# Patient Record
Sex: FEMALE | Race: WHITE | NOT HISPANIC OR LATINO | Employment: OTHER | URBAN - METROPOLITAN AREA
[De-identification: names, ages, dates, MRNs, and addresses within clinical notes are randomized per-mention and may not be internally consistent; named-entity substitution may affect disease eponyms.]

---

## 2017-01-10 RX ORDER — SPIRONOLACTONE 100 MG/1
100 TABLET, FILM COATED ORAL DAILY
Qty: 90 TABLET | Refills: 3 | Status: SHIPPED | OUTPATIENT
Start: 2017-01-10 | End: 2017-01-10 | Stop reason: SDUPTHER

## 2017-01-10 RX ORDER — SPIRONOLACTONE 100 MG/1
100 TABLET, FILM COATED ORAL DAILY
Qty: 90 TABLET | Refills: 3 | Status: SHIPPED | OUTPATIENT
Start: 2017-01-10 | End: 2017-01-11 | Stop reason: SDUPTHER

## 2017-01-10 NOTE — TELEPHONE ENCOUNTER
i refilled rx spironolactone but it was sent without a pharmacy. Please find out from her which pharmacy for me to resend  to

## 2017-01-12 RX ORDER — SPIRONOLACTONE 100 MG/1
100 TABLET, FILM COATED ORAL DAILY
Qty: 90 TABLET | Refills: 3 | Status: SHIPPED | OUTPATIENT
Start: 2017-01-12 | End: 2017-06-29 | Stop reason: SDUPTHER

## 2017-01-12 RX ORDER — BUPROPION HYDROCHLORIDE 150 MG/1
150 TABLET, EXTENDED RELEASE ORAL 2 TIMES DAILY
Qty: 180 TABLET | Refills: 3 | Status: SHIPPED | OUTPATIENT
Start: 2017-01-12 | End: 2017-01-30

## 2017-01-23 DIAGNOSIS — N94.3 PMS (PREMENSTRUAL SYNDROME): ICD-10-CM

## 2017-01-23 RX ORDER — BUPROPION HYDROCHLORIDE 150 MG/1
150 TABLET ORAL 2 TIMES DAILY
Qty: 180 TABLET | Refills: 3 | Status: SHIPPED | OUTPATIENT
Start: 2017-01-23 | End: 2017-01-31 | Stop reason: SDUPTHER

## 2017-01-23 RX ORDER — BUPROPION HYDROCHLORIDE 150 MG/1
150 TABLET, EXTENDED RELEASE ORAL 2 TIMES DAILY
Qty: 180 TABLET | Refills: 3 | Status: CANCELLED | OUTPATIENT
Start: 2017-01-23

## 2017-01-30 ENCOUNTER — PATIENT MESSAGE (OUTPATIENT)
Dept: INTERNAL MEDICINE | Facility: CLINIC | Age: 52
End: 2017-01-30

## 2017-01-30 ENCOUNTER — TELEPHONE (OUTPATIENT)
Dept: INTERNAL MEDICINE | Facility: CLINIC | Age: 52
End: 2017-01-30

## 2017-01-30 DIAGNOSIS — N94.3 PMS (PREMENSTRUAL SYNDROME): ICD-10-CM

## 2017-01-30 NOTE — TELEPHONE ENCOUNTER
----- Message from Luisa Cabrera sent at 1/30/2017 10:06 AM CST -----  Contact: [facility] [phone] Ref # [id number]  [facility] requests nurse  To call and clarify pt's prescription for Wellbutrin. [phone]    ..    [facility] - [address]  [address]  [place] [address]  Phone: [phone] Fax: [phone]

## 2017-01-31 RX ORDER — BUPROPION HYDROCHLORIDE 150 MG/1
150 TABLET ORAL DAILY
Qty: 180 TABLET | Refills: 3 | Status: SHIPPED | OUTPATIENT
Start: 2017-01-31 | End: 2017-06-29 | Stop reason: SDUPTHER

## 2017-06-19 ENCOUNTER — NURSE TRIAGE (OUTPATIENT)
Dept: ADMINISTRATIVE | Facility: CLINIC | Age: 52
End: 2017-06-19

## 2017-06-19 NOTE — TELEPHONE ENCOUNTER
Reason for Disposition   SEVERE vomiting (e.g., 6 or more times/day)    Protocols used: ST VOMITING-A-OH

## 2017-06-26 ENCOUNTER — LAB VISIT (OUTPATIENT)
Dept: LAB | Facility: HOSPITAL | Age: 52
End: 2017-06-26
Attending: FAMILY MEDICINE
Payer: COMMERCIAL

## 2017-06-26 DIAGNOSIS — Z00.00 ROUTINE HEALTH MAINTENANCE: ICD-10-CM

## 2017-06-26 LAB
ANION GAP SERPL CALC-SCNC: 9 MMOL/L
BUN SERPL-MCNC: 8 MG/DL
CALCIUM SERPL-MCNC: 9.9 MG/DL
CHLORIDE SERPL-SCNC: 107 MMOL/L
CHOLEST/HDLC SERPL: 7.2 {RATIO}
CO2 SERPL-SCNC: 25 MMOL/L
CREAT SERPL-MCNC: 0.8 MG/DL
EST. GFR  (AFRICAN AMERICAN): >60 ML/MIN/1.73 M^2
EST. GFR  (NON AFRICAN AMERICAN): >60 ML/MIN/1.73 M^2
GLUCOSE SERPL-MCNC: 94 MG/DL
HDL/CHOLESTEROL RATIO: 14 %
HDLC SERPL-MCNC: 215 MG/DL
HDLC SERPL-MCNC: 30 MG/DL
LDLC SERPL CALC-MCNC: 160.8 MG/DL
NONHDLC SERPL-MCNC: 185 MG/DL
POTASSIUM SERPL-SCNC: 4.6 MMOL/L
SODIUM SERPL-SCNC: 141 MMOL/L
TRIGL SERPL-MCNC: 121 MG/DL
TSH SERPL DL<=0.005 MIU/L-ACNC: 0.49 UIU/ML

## 2017-06-26 PROCEDURE — 84443 ASSAY THYROID STIM HORMONE: CPT

## 2017-06-26 PROCEDURE — 36415 COLL VENOUS BLD VENIPUNCTURE: CPT | Mod: PO

## 2017-06-26 PROCEDURE — 80061 LIPID PANEL: CPT

## 2017-06-26 PROCEDURE — 80048 BASIC METABOLIC PNL TOTAL CA: CPT

## 2017-06-29 ENCOUNTER — OFFICE VISIT (OUTPATIENT)
Dept: INTERNAL MEDICINE | Facility: CLINIC | Age: 52
End: 2017-06-29
Payer: COMMERCIAL

## 2017-06-29 VITALS
DIASTOLIC BLOOD PRESSURE: 78 MMHG | HEART RATE: 82 BPM | WEIGHT: 178.38 LBS | BODY MASS INDEX: 26.42 KG/M2 | OXYGEN SATURATION: 96 % | HEIGHT: 69 IN | TEMPERATURE: 98 F | SYSTOLIC BLOOD PRESSURE: 108 MMHG

## 2017-06-29 DIAGNOSIS — Z00.00 ANNUAL PHYSICAL EXAM: Primary | ICD-10-CM

## 2017-06-29 DIAGNOSIS — E03.9 HYPOTHYROIDISM, UNSPECIFIED TYPE: ICD-10-CM

## 2017-06-29 DIAGNOSIS — K58.9 IRRITABLE BOWEL SYNDROME WITHOUT DIARRHEA: ICD-10-CM

## 2017-06-29 DIAGNOSIS — G47.63 SLEEP-RELATED BRUXISM: ICD-10-CM

## 2017-06-29 DIAGNOSIS — F32.A DEPRESSION, UNSPECIFIED DEPRESSION TYPE: ICD-10-CM

## 2017-06-29 DIAGNOSIS — E78.5 HYPERLIPIDEMIA, UNSPECIFIED HYPERLIPIDEMIA TYPE: ICD-10-CM

## 2017-06-29 DIAGNOSIS — K21.9 GASTROESOPHAGEAL REFLUX DISEASE WITHOUT ESOPHAGITIS: ICD-10-CM

## 2017-06-29 PROCEDURE — 99396 PREV VISIT EST AGE 40-64: CPT | Mod: S$GLB,,, | Performed by: NURSE PRACTITIONER

## 2017-06-29 PROCEDURE — 99999 PR PBB SHADOW E&M-EST. PATIENT-LVL IV: CPT | Mod: PBBFAC,,, | Performed by: NURSE PRACTITIONER

## 2017-06-29 RX ORDER — HYOSCYAMINE SULFATE 0.38 MG/1
0.38 TABLET, EXTENDED RELEASE ORAL EVERY 12 HOURS PRN
Qty: 90 TABLET | Refills: 3 | Status: SHIPPED | OUTPATIENT
Start: 2017-06-29 | End: 2017-12-15 | Stop reason: SDUPTHER

## 2017-06-29 RX ORDER — SPIRONOLACTONE 100 MG/1
100 TABLET, FILM COATED ORAL DAILY
Qty: 90 TABLET | Refills: 3 | Status: SHIPPED | OUTPATIENT
Start: 2017-06-29 | End: 2017-12-15 | Stop reason: SDUPTHER

## 2017-06-29 RX ORDER — BUPROPION HYDROCHLORIDE 150 MG/1
150 TABLET ORAL DAILY
Qty: 180 TABLET | Refills: 3 | Status: SHIPPED | OUTPATIENT
Start: 2017-06-29 | End: 2017-12-15 | Stop reason: SDUPTHER

## 2017-06-29 NOTE — PROGRESS NOTES
Subjective:       Patient ID: Vera Greer is a 52 y.o. female.    Chief Complaint: No chief complaint on file.    Pt here for annual wellness exam    Pt doing well        Past Medical History:  Anxiety - controlled with wellbutrin -   Depression - well controlled on wellbutrin   GERD (gastroesophageal reflux disease)- well controlled with Prilosec once weekly and diet  Hypothyroid-- TSH- not currently taking thyroid medication. No   Irritable bowel syndrome--- levbid- controlled- drinks lots of fluids -BM daily. No blood or mucus in stool  Sleep-related bruxism no need for mouth piece-- sees --every 6 months dentist    Eye exam- been a few years since last exam. Wears Reading glasses    Colonoscopy-done last year- normal  She mentions that she does not exercise regularly  Watches diet- vege/fruit;   Up to date with mammogram/pap done this Feb- sees Dr. Moses Caceres at [facility] ;Dr. Aiden Crystal- [place]- [facility] - started hormone replcement therapy a week ago. No acute problems. No hot flashes - good energy       Review of Systems   Constitutional: Negative for chills, fatigue and fever.   HENT: Negative for congestion, ear pain, postnasal drip, rhinorrhea, sinus pressure, sneezing and sore throat.    Eyes: Negative for photophobia, pain and visual disturbance.   Respiratory: Negative for cough, chest tightness and shortness of breath.    Cardiovascular: Negative for chest pain, palpitations and leg swelling.   Gastrointestinal: Negative for abdominal pain, constipation, diarrhea, nausea and vomiting.   Genitourinary: Negative for dysuria and frequency.   Musculoskeletal: Negative for arthralgias.   Neurological: Negative for dizziness, light-headedness and headaches.   Psychiatric/Behavioral: Negative for sleep disturbance.       Objective:      Physical Exam   Constitutional: She is oriented to person, place, and time. She appears well-developed and well-nourished.   HENT:   Head: Normocephalic  and atraumatic.   Right Ear: Tympanic membrane normal.   Left Ear: Tympanic membrane normal.   Eyes: Conjunctivae and EOM are normal. Pupils are equal, round, and reactive to light.   Neck: Normal range of motion. Neck supple.   Cardiovascular: Normal rate, regular rhythm, normal heart sounds and intact distal pulses.    Pulmonary/Chest: Effort normal and breath sounds normal.   Abdominal: Soft. Bowel sounds are normal.   Musculoskeletal: Normal range of motion.   Neurological: She is alert and oriented to person, place, and time.   Skin: Skin is warm and dry.   Psychiatric: She has a normal mood and affect.       Assessment:       1. Annual physical exam    2. Hypothyroidism, unspecified type    3. Irritable bowel syndrome without diarrhea    4. Sleep-related bruxism    5. Gastroesophageal reflux disease without esophagitis    6. Depression, unspecified depression type    7. Hyperlipidemia, unspecified hyperlipidemia type        Plan:   Annual physical exam    Hypothyroidism, unspecified type    Irritable bowel syndrome without diarrhea  -     hyoscyamine (LEVBID) 0.375 mg Tb12; Take 1 tablet (0.375 mg total) by mouth every 12 (twelve) hours as needed.  Dispense: 90 tablet; Refill: 3    Sleep-related bruxism    Gastroesophageal reflux disease without esophagitis    Depression, unspecified depression type  -     buPROPion (WELLBUTRIN XL) 150 MG TB24 tablet; Take 1 tablet (150 mg total) by mouth once daily.  Dispense: 180 tablet; Refill: 3    Hyperlipidemia, unspecified hyperlipidemia type    Other orders  -     spironolactone (ALDACTONE) 100 MG tablet; Take 1 tablet (100 mg total) by mouth once daily.  Dispense: 90 tablet; Refill: 3    med refills as above  Pt doing well  Watch TSH level  Follow up in 1 year or sooner as needed

## 2017-10-10 ENCOUNTER — PATIENT MESSAGE (OUTPATIENT)
Dept: INTERNAL MEDICINE | Facility: CLINIC | Age: 52
End: 2017-10-10

## 2017-11-08 ENCOUNTER — OFFICE VISIT (OUTPATIENT)
Dept: DERMATOLOGY | Facility: CLINIC | Age: 52
End: 2017-11-08
Payer: COMMERCIAL

## 2017-11-08 DIAGNOSIS — L29.9 SCALP PRURITUS: ICD-10-CM

## 2017-11-08 DIAGNOSIS — L82.1 SEBORRHEIC KERATOSIS: ICD-10-CM

## 2017-11-08 DIAGNOSIS — K13.0 PERLECHE: Primary | ICD-10-CM

## 2017-11-08 DIAGNOSIS — D22.9 MULTIPLE NEVI: ICD-10-CM

## 2017-11-08 PROCEDURE — 99203 OFFICE O/P NEW LOW 30 MIN: CPT | Mod: S$GLB,,, | Performed by: DERMATOLOGY

## 2017-11-08 PROCEDURE — 99999 PR PBB SHADOW E&M-EST. PATIENT-LVL II: CPT | Mod: PBBFAC,,, | Performed by: DERMATOLOGY

## 2017-11-08 RX ORDER — KETOCONAZOLE 20 MG/G
CREAM TOPICAL
Qty: 60 G | Refills: 1 | Status: SHIPPED | OUTPATIENT
Start: 2017-11-08 | End: 2018-06-18

## 2017-11-08 NOTE — PROGRESS NOTES
Subjective:       Patient ID:  Vera Greer is a 52 y.o. female who presents for   Chief Complaint   Patient presents with    Skin Check     TBSE     Pt was previously seen by Dr. Montero, here today to establish care    History of Present Illness: The patient presents with chief complaint of scalp pruritus. Onset after use of hair and nail vitamins.   Location: scalp  Duration: several months  Signs/Symptoms: pruritus    Prior treatments: none          Review of Systems   Constitutional: Negative for fever and chills.   Gastrointestinal: Negative for nausea and vomiting.   Skin: Negative for daily sunscreen use, activity-related sunscreen use and recent sunburn.   Hematologic/Lymphatic: Does not bruise/bleed easily.        Objective:    Physical Exam   Constitutional: She appears well-developed and well-nourished. No distress.   Neurological: She is alert and oriented to person, place, and time. She is not disoriented.   Psychiatric: She has a normal mood and affect.   Skin:   Areas Examined (abnormalities noted in diagram):   Scalp / Hair Palpated and Inspected  Head / Face Inspection Performed  Neck Inspection Performed  Chest / Axilla Inspection Performed  Abdomen Inspection Performed  Genitals / Buttocks / Groin Inspection Performed  Back Inspection Performed  RUE Inspected  LUE Inspection Performed  RLE Inspected  LLE Inspection Performed  Nails and Digits Inspection Performed                   Diagram Legend     Erythematous scaling macule/papule c/w actinic keratosis       Vascular papule c/w angioma      Pigmented verrucoid papule/plaque c/w seborrheic keratosis      Yellow umbilicated papule c/w sebaceous hyperplasia      Irregularly shaped tan macule c/w lentigo     1-2 mm smooth white papules consistent with Milia      Movable subcutaneous cyst with punctum c/w epidermal inclusion cyst      Subcutaneous movable cyst c/w pilar cyst      Firm pink to brown papule c/w dermatofibroma      Pedunculated  fleshy papule(s) c/w skin tag(s)      Evenly pigmented macule c/w junctional nevus     Mildly variegated pigmented, slightly irregular-bordered macule c/w mildly atypical nevus      Flesh colored to evenly pigmented papule c/w intradermal nevus       Pink pearly papule/plaque c/w basal cell carcinoma      Erythematous hyperkeratotic cursted plaque c/w SCC      Surgical scar with no sign of skin cancer recurrence      Open and closed comedones      Inflammatory papules and pustules      Verrucoid papule consistent consistent with wart     Erythematous eczematous patches and plaques     Dystrophic onycholytic nail with subungual debris c/w onychomycosis     Umbilicated papule    Erythematous-base heme-crusted tan verrucoid plaque consistent with inflamed seborrheic keratosis     Erythematous Silvery Scaling Plaque c/w Psoriasis     See annotation      Assessment / Plan:        Perleche  -     ketoconazole (NIZORAL) 2 % cream; AAA bid prn rash near lips  Dispense: 60 g; Refill: 1  -     Recommend blotting area with vinegar 2-3 times/day    Multiple nevi  Reassurance given.  Discussed ABCDEF of melanoma and changes for patient to look for.  AAD Handout given. Discussed importance of daily use of sunscreen.      Seborrheic keratosis  Reassurance given. Discussed diagnosis and that lesions are benign.  AAD handout given.     Scalp pruritus  Pt defers treatment, states pruritus is secondary to hair/nail supplement.           Return in about 1 year (around 11/8/2018).     Addendum:  Records received from Dr. Montero.  Pt with hx of several benign lesions including nevi and SK.  Pt also s/p use of tretinoin 0.05% cream and retin-A 0.1% micro in past.

## 2017-11-08 NOTE — PATIENT INSTRUCTIONS

## 2017-12-15 DIAGNOSIS — K58.9 IRRITABLE BOWEL SYNDROME WITHOUT DIARRHEA: ICD-10-CM

## 2017-12-15 DIAGNOSIS — F32.A DEPRESSION, UNSPECIFIED DEPRESSION TYPE: ICD-10-CM

## 2017-12-15 RX ORDER — HYOSCYAMINE SULFATE 0.38 MG/1
0.38 TABLET, EXTENDED RELEASE ORAL EVERY 12 HOURS PRN
Qty: 90 TABLET | Refills: 3 | Status: SHIPPED | OUTPATIENT
Start: 2017-12-15 | End: 2018-06-18 | Stop reason: SDUPTHER

## 2017-12-15 RX ORDER — BUPROPION HYDROCHLORIDE 150 MG/1
150 TABLET ORAL DAILY
Qty: 180 TABLET | Refills: 3 | Status: SHIPPED | OUTPATIENT
Start: 2017-12-15 | End: 2018-06-18 | Stop reason: SDUPTHER

## 2017-12-15 RX ORDER — SPIRONOLACTONE 100 MG/1
100 TABLET, FILM COATED ORAL DAILY
Qty: 90 TABLET | Refills: 3 | Status: SHIPPED | OUTPATIENT
Start: 2017-12-15 | End: 2018-06-18 | Stop reason: SDUPTHER

## 2017-12-15 NOTE — TELEPHONE ENCOUNTER
----- Message from Juan David Rodríguez sent at 12/15/2017  2:04 PM CST -----  Contact: pt   States she's calling rg wanting to discuss getting her refill and was last in this past June and was told she needs to be seen and can be reached at [phone]//[email or web address]

## 2017-12-15 NOTE — TELEPHONE ENCOUNTER
----- Message from Juan David Rodríguez sent at 12/15/2017  2:04 PM CST -----  Contact: pt   States she's calling rg wanting to discuss getting her refill and was last in this past June and was told she needs to be seen and can be reached at [phone]//[email or web address]

## 2017-12-16 NOTE — TELEPHONE ENCOUNTER
Please let her know I made an error . She saw Ashleigh June 2017 ( me June 2016) so no need to come in until June 2018 and I will refill meds

## 2017-12-18 ENCOUNTER — TELEPHONE (OUTPATIENT)
Dept: PODIATRY | Facility: CLINIC | Age: 52
End: 2017-12-18

## 2017-12-18 DIAGNOSIS — M79.671 RIGHT FOOT PAIN: Primary | ICD-10-CM

## 2017-12-18 NOTE — TELEPHONE ENCOUNTER
Pt called to request morning appt on 12/19/17. Informed that she will be contacted if someone cancels. Verbalized understanding.

## 2017-12-19 ENCOUNTER — HOSPITAL ENCOUNTER (OUTPATIENT)
Dept: RADIOLOGY | Facility: HOSPITAL | Age: 52
Discharge: HOME | End: 2017-12-19
Attending: PODIATRIST
Payer: COMMERCIAL

## 2017-12-19 ENCOUNTER — OFFICE VISIT (OUTPATIENT)
Dept: PODIATRY | Facility: CLINIC | Age: 52
End: 2017-12-19
Payer: COMMERCIAL

## 2017-12-19 VITALS
WEIGHT: 178.38 LBS | DIASTOLIC BLOOD PRESSURE: 78 MMHG | BODY MASS INDEX: 26.42 KG/M2 | HEART RATE: 83 BPM | HEIGHT: 69 IN | SYSTOLIC BLOOD PRESSURE: 124 MMHG

## 2017-12-19 DIAGNOSIS — M20.42 HAMMERTOES OF BOTH FEET: ICD-10-CM

## 2017-12-19 DIAGNOSIS — M20.5X1 ADDUCTOVARUS ROTATION OF TOE, ACQUIRED, RIGHT: ICD-10-CM

## 2017-12-19 DIAGNOSIS — M20.41 HAMMERTOES OF BOTH FEET: ICD-10-CM

## 2017-12-19 DIAGNOSIS — M79.671 RIGHT FOOT PAIN: ICD-10-CM

## 2017-12-19 DIAGNOSIS — M20.5X2 ACQUIRED ADDUCTOVARUS ROTATION OF TOE OF LEFT FOOT: Primary | ICD-10-CM

## 2017-12-19 PROCEDURE — 99999 PR PBB SHADOW E&M-EST. PATIENT-LVL III: CPT | Mod: PBBFAC,,, | Performed by: PODIATRIST

## 2017-12-19 PROCEDURE — 73630 X-RAY EXAM OF FOOT: CPT | Mod: 26,50,, | Performed by: RADIOLOGY

## 2017-12-19 PROCEDURE — 73630 X-RAY EXAM OF FOOT: CPT | Mod: 50,TC,PO

## 2017-12-19 PROCEDURE — 99203 OFFICE O/P NEW LOW 30 MIN: CPT | Mod: S$GLB,,, | Performed by: PODIATRIST

## 2017-12-19 NOTE — PATIENT INSTRUCTIONS
Foot Surgery: Curled Fifth Toe  Hammertoes can make walking painful. With hammertoes, one or more toes curl or bend abnormally. This can be caused by an inherited muscle problem, an abnormal bone length, or poor foot mechanics. The affected joints can rub inside shoes, causing corns (buildups of dead skin).    Curled fifth toe  A curled fifth toe is most often inherited. When the fifth toe curls inward, it moves under the next toe. Then the nail of the curled toe starts to face outward. As a result, you may bear weight on the side of your toe instead of the bottom. This can cause corns and painful nails.  There are many nonsurgical treatments available. But if these are not effective, surgery is a choice.    Derotation arthroplasty  A wedge of skin and a section of bone are removed to help straighten (derotate) the toe. You can bear weight on your foot right after surgery. In some cases, you may need to wear a bandage, splint, and surgical shoe for a few weeks. When healed, the bones become connected with scar tissue.  Date Last Reviewed: 10/15/2015  © 9924-3201 [employer]. [address] [place] [address], [place] [address]. All rights reserved. This information is not intended as a substitute for professional medical care. Always follow your healthcare professional's instructions.

## 2017-12-19 NOTE — PROGRESS NOTES
[facility]  PODIATRIC MEDICINE AND SURGERY  PROGRESS NOTE  12/27/2017    PODIATRY NOTE  PCP: Dr. Taz Cartagena MD    CHIEF COMPLAINT   Chief Complaint   Patient presents with    Foot Pain     right 5th toe pain, left 4th and 5th toe pain with shoes on with redness xrays prior       HPI  Vera Greer is a 52 y.o. female who has a past medical history of Anxiety; Chronic edema; Depression; GERD (gastroesophageal reflux disease); Hyperlipidemia; Hypothyroid; Irritable bowel syndrome; and Sleep-related bruxism.   Vera presents to clinic today complaining of  Fifth toe pain and redness.    Patient describes pain as:   Location:bilateral fifth digit, left fourth   Quality: throbbing   Severity:3/10  Duration: several years  Modifying Factors (Aggravating): shoewear   Modifying Factors (Alleviating): no pressure    Patient denies other pedal complaints at this time.     PMH  Past Medical History:   Diagnosis Date    Anxiety     Chronic edema     aldactone and for hirsutism?    Depression     GERD (gastroesophageal reflux disease)     Hyperlipidemia     Hypothyroid     Irritable bowel syndrome     Sleep-related bruxism     chronic benzo x years       PROBLEM LIST  Patient Active Problem List    Diagnosis Date Noted    Colon cancer screening 04/27/2016    Hypothyroid     Irritable bowel syndrome     Sleep-related bruxism     GERD (gastroesophageal reflux disease)     Depression     Hyperlipidemia        MEDS  Current Outpatient Prescriptions on File Prior to Visit   Medication Sig Dispense Refill    buPROPion (WELLBUTRIN XL) 150 MG TB24 tablet Take 1 tablet (150 mg total) by mouth once daily. 180 tablet 3    CALCIUM/MAGNESIUM/VIT D3 (HEALTHY BONE FORMULA ORAL) Take by mouth.      DHA/EPA/MV/DNG JUANI/HRB42 (PMS WOMENS FORMULA ORAL) Take by mouth. phytoestrogen      ergocalciferol (ERGOCALCIFEROL) 50,000 unit Cap Take 50,000 Units by mouth every 7 days.      hyoscyamine (LEVBID)  0.375 mg Tb12 Take 1 tablet (0.375 mg total) by mouth every 12 (twelve) hours as needed. 90 tablet 3    ketoconazole (NIZORAL) 2 % cream AAA bid prn rash near lips 60 g 1    multivitamin capsule Take by mouth. 1 Capsule Oral Every day      omega-3 fatty acids-vitamin E (OMEGA-3 FISH OIL) 1,000-5 mg-unit Cap Take by mouth. 1 Capsule Oral Every day      omeprazole (PRILOSEC) 20 MG capsule Take 20 mg by mouth once a week.      SOY ISOFLAVONES ORAL Take 40 mg by mouth once daily.      spironolactone (ALDACTONE) 100 MG tablet Take 1 tablet (100 mg total) by mouth once daily. 90 tablet 3    UNABLE TO FIND biote hormone pellets       No current facility-administered medications on file prior to visit.        Medication List with Changes/Refills   Current Medications    BUPROPION (WELLBUTRIN XL) 150 MG TB24 TABLET    Take 1 tablet (150 mg total) by mouth once daily.    CALCIUM/MAGNESIUM/VIT D3 (HEALTHY BONE FORMULA ORAL)    Take by mouth.    DHA/EPA/MV/DNG JUANI/HRB42 (PMS WOMENS FORMULA ORAL)    Take by mouth. phytoestrogen    ERGOCALCIFEROL (ERGOCALCIFEROL) 50,000 UNIT CAP    Take 50,000 Units by mouth every 7 days.    HYOSCYAMINE (LEVBID) 0.375 MG TB12    Take 1 tablet (0.375 mg total) by mouth every 12 (twelve) hours as needed.    KETOCONAZOLE (NIZORAL) 2 % CREAM    AAA bid prn rash near lips    MULTIVITAMIN CAPSULE    Take by mouth. 1 Capsule Oral Every day    OMEGA-3 FATTY ACIDS-VITAMIN E (OMEGA-3 FISH OIL) 1,000-5 MG-UNIT CAP    Take by mouth. 1 Capsule Oral Every day    OMEPRAZOLE (PRILOSEC) 20 MG CAPSULE    Take 20 mg by mouth once a week.    SOY ISOFLAVONES ORAL    Take 40 mg by mouth once daily.    SPIRONOLACTONE (ALDACTONE) 100 MG TABLET    Take 1 tablet (100 mg total) by mouth once daily.    UNABLE TO FIND    biote hormone pellets       PSH     Past Surgical History:   Procedure Laterality Date    ADENOIDECTOMY      APPENDECTOMY      BELT ABDOMINOPLASTY      BREAST SURGERY      reduction      "CHOLECYSTECTOMY      COLONOSCOPY N/A 4/27/2016    Procedure: COLONOSCOPY;  Surgeon: Lisbeth Ruth MD;  Location: [facility];  Service: Endoscopy;  Laterality: N/A;    HYSTERECTOMY      partial     TONSILLECTOMY          ALL  Review of patient's allergies indicates:   Allergen Reactions    Augmentin [amoxicillin-pot clavulanate]      Bad yeast infxn. ;she prefers other abx       SOC     Social History   Substance Use Topics    Smoking status: Never Smoker    Smokeless tobacco: Never Used    Alcohol use 0.0 oz/week      Comment: once a year          FAMILY HX    Family History   Problem Relation Age of Onset    Arthritis Mother     Heart disease Father     Hypertension Father     Colon cancer Neg Hx             REVIEW OF SYSTEMS  Constitutional: Negative for chills and fever.   Respiratory: Negative for shortness of breath.    Cardiovascular: Negative for chest pain, palpitations, orthopnea, claudication and leg swelling.   Gastrointestinal: Negative for diarrhea, nausea and vomiting.   Musculoskeletal: Negative for joint pain. Positive for bilateral toe pain  Skin: Negative for rash.   Neurological: Negative for dizziness, tingling, sensory change, focal weakness and weakness.   Psychiatric/Behavioral: Negative.    PHYSICAL EXAM  Vitals:    12/19/17 1351   BP: 124/78   Pulse: 83   Weight: 80.9 kg (178 lb 5.6 oz)   Height: 5' 9" (1.753 m)   PainSc:   3   PainLoc: Toe       General: This patient is well-developed, well-nourished and appears stated age, well-oriented to person, place and time, and cooperative and pleasant on today's visit    LOWER EXTREMITY  Vascular exam:   · Dorsalis pedis and posterior tibial pulses palpable 2/4 bilaterally.   · Capillary refill time immediate to the toes.   · Feet are warm to the touch. Skin temperature warm to warm from proximally to distally   · There are no varicosities, telangiectasias noted to bilateral foot and ankle regions.   · There are no ecchymoses noted " to bilateral foot and ankle regions.   · There is no gross lower extremity edema.    Dermatologic exam:   · Skin moist with healthy texture and turgor.  · There are no open ulcerations, lacerations, or fissures to bilateral foot and ankle regions. There are no signs of infection as there is no erythema, no proximal-extending lymphangiitis, no fluctuance, or crepitus noted on palpation to bilateral foot and ankle regions.   · There is no interdigital maceration.   · There are no hyperkeratotic lesions noted to feet. Nails are well-trimmed.    Neurologic exam:  · Epicritic sensation is intact as the patient is able to sense light touch to bilateral foot and ankle regions.   · Achilles and patellar deep tendon reflexes intact  · Babinski reflex absent    Musculoskeletal/Orthopedic exam:   · Adductovarus rotation b/l 5th digit  · Reducible contracted digit 4th b/l  · Muscle strength AT/EHL/EDL/PT: 5/5; Achilles/Gastroc/Soleus: 5/5; PB/PL: 5/5 Muscle tone is normal.  · Ankle joint ROM  B/L supple DF/PF, non-crepitus  · STJ ROM supple inv/ev, non crepitus   · MTPJ b/l supple DF/PF, non crepitus    IMAGING   Reviewed by me and I agree with radiologist findings, 3 views of foot/ankle, reveal:  Results for orders placed during the hospital encounter of 12/19/17   X-Ray Foot Complete 3 view Bilateral    Narrative Technique: AP, lateral, and oblique views were obtained of the bilateral feet    Comparison: none    Findings: There is no radiographic evidence of acute osseous, articular, or soft tissue abnormality. Joint spaces are well preserved.  No erosive osseous changes demonstrated.Calcaneal enthesophytes are present bilaterally, larger on the right.    Impression As above. No acute findings.            Electronically signed by: JONATHAN WEBER MD  Date:     12/19/17  Time:    13:57            Results for orders placed during the hospital encounter of 12/19/17   X-Ray Foot Complete 3 view Bilateral    Narrative Technique:  AP, lateral, and oblique views were obtained of the bilateral feet    Comparison: none    Findings: There is no radiographic evidence of acute osseous, articular, or soft tissue abnormality. Joint spaces are well preserved.  No erosive osseous changes demonstrated.Calcaneal enthesophytes are present bilaterally, larger on the right.    Impression As above. No acute findings.            Electronically signed by: JONATHAN WEBER MD  Date:     12/19/17  Time:    13:57        No results found for this or any previous visit.     No results found for this or any previous visit.        ASSESSMENT  Encounter Diagnoses   Name Primary?    Acquired adductovarus rotation of toe of left foot Yes    Adductovarus rotation of toe, acquired, right     Hammertoes of both feet          PLAN  1. Patient was educated about clinical and imaging findings, and verbalizes understanding of above.     Diagnoses and all orders for this visit:  Acquired adductovarus rotation of toe of left foot    Adductovarus rotation of toe, acquired, right    Hammertoes of both feet      2. Treatment plan: Reviewed findings and explained condition to the patient with visual reference to the foot and x-rays. I explained that hammertoe contractures may be inherited or acquired over time as a result of neurological or muscular soft tissue imbalances, external constriction on long toes, traumatic injuries, or arthritic type conditions. I explained that sometimes surgical intervention involving a soft tissue re-balancing/repair, osseous reconstruction, or a combination of both is the permanent solution, but conservative measures should be attempted first.   Provided patient with hammertoe Crest buttress pad and/or offloading toe silicone pad to reduce contracture and/or off-load pressure at the dorsal interphalangeal joint and distal tip of the toe. Patient was also guided on shoe gear selection of extra depth or larger toe boxed shoes.        3. RTC  for  follow up/evaluation as scheduled       Future Appointments  Date Time Provider Department Center   6/18/2018 2:20 PM Taz Cartagena MD [facility] [place]       Report Electronically Signed By:  Belia Morocho DPM   Podiatric Medicine & Surgery  [facility]  12/27/2017

## 2018-05-21 RX ORDER — SPIRONOLACTONE 100 MG/1
TABLET, FILM COATED ORAL
Qty: 90 TABLET | OUTPATIENT
Start: 2018-05-21

## 2018-06-18 ENCOUNTER — OFFICE VISIT (OUTPATIENT)
Dept: INTERNAL MEDICINE | Facility: CLINIC | Age: 53
End: 2018-06-18
Payer: COMMERCIAL

## 2018-06-18 VITALS
WEIGHT: 184.31 LBS | HEIGHT: 69 IN | OXYGEN SATURATION: 96 % | DIASTOLIC BLOOD PRESSURE: 86 MMHG | HEART RATE: 74 BPM | BODY MASS INDEX: 27.3 KG/M2 | SYSTOLIC BLOOD PRESSURE: 108 MMHG | TEMPERATURE: 97 F

## 2018-06-18 DIAGNOSIS — K21.9 GASTROESOPHAGEAL REFLUX DISEASE WITHOUT ESOPHAGITIS: ICD-10-CM

## 2018-06-18 DIAGNOSIS — Z12.39 BREAST CANCER SCREENING: ICD-10-CM

## 2018-06-18 DIAGNOSIS — E03.9 HYPOTHYROIDISM, UNSPECIFIED TYPE: ICD-10-CM

## 2018-06-18 DIAGNOSIS — F32.A DEPRESSION, UNSPECIFIED DEPRESSION TYPE: ICD-10-CM

## 2018-06-18 DIAGNOSIS — Z00.00 ROUTINE HEALTH MAINTENANCE: Primary | ICD-10-CM

## 2018-06-18 DIAGNOSIS — E78.5 HYPERLIPIDEMIA, UNSPECIFIED HYPERLIPIDEMIA TYPE: ICD-10-CM

## 2018-06-18 DIAGNOSIS — K58.9 IRRITABLE BOWEL SYNDROME WITHOUT DIARRHEA: ICD-10-CM

## 2018-06-18 PROCEDURE — 3074F SYST BP LT 130 MM HG: CPT | Mod: CPTII,S$GLB,, | Performed by: NURSE PRACTITIONER

## 2018-06-18 PROCEDURE — 3008F BODY MASS INDEX DOCD: CPT | Mod: CPTII,S$GLB,, | Performed by: NURSE PRACTITIONER

## 2018-06-18 PROCEDURE — 99214 OFFICE O/P EST MOD 30 MIN: CPT | Mod: S$GLB,,, | Performed by: NURSE PRACTITIONER

## 2018-06-18 PROCEDURE — 99999 PR PBB SHADOW E&M-EST. PATIENT-LVL IV: CPT | Mod: PBBFAC,,, | Performed by: NURSE PRACTITIONER

## 2018-06-18 PROCEDURE — 3079F DIAST BP 80-89 MM HG: CPT | Mod: CPTII,S$GLB,, | Performed by: NURSE PRACTITIONER

## 2018-06-18 RX ORDER — HYOSCYAMINE SULFATE 0.38 MG/1
0.38 TABLET, EXTENDED RELEASE ORAL EVERY 12 HOURS PRN
Qty: 90 TABLET | Refills: 3 | Status: SHIPPED | OUTPATIENT
Start: 2018-06-18 | End: 2019-02-06 | Stop reason: SDUPTHER

## 2018-06-18 RX ORDER — SPIRONOLACTONE 100 MG/1
100 TABLET, FILM COATED ORAL DAILY
Qty: 90 TABLET | Refills: 3 | Status: SHIPPED | OUTPATIENT
Start: 2018-06-18 | End: 2019-02-06 | Stop reason: SDUPTHER

## 2018-06-18 RX ORDER — BUPROPION HYDROCHLORIDE 150 MG/1
150 TABLET ORAL DAILY
Qty: 90 TABLET | Refills: 3 | Status: SHIPPED | OUTPATIENT
Start: 2018-06-18 | End: 2019-02-06 | Stop reason: SDUPTHER

## 2018-06-20 ENCOUNTER — LAB VISIT (OUTPATIENT)
Dept: LAB | Facility: HOSPITAL | Age: 53
End: 2018-06-20
Attending: NURSE PRACTITIONER
Payer: COMMERCIAL

## 2018-06-20 DIAGNOSIS — E03.9 HYPOTHYROIDISM, UNSPECIFIED TYPE: ICD-10-CM

## 2018-06-20 DIAGNOSIS — E78.5 HYPERLIPIDEMIA, UNSPECIFIED HYPERLIPIDEMIA TYPE: ICD-10-CM

## 2018-06-20 DIAGNOSIS — K21.9 GASTROESOPHAGEAL REFLUX DISEASE WITHOUT ESOPHAGITIS: ICD-10-CM

## 2018-06-20 DIAGNOSIS — Z00.00 ROUTINE HEALTH MAINTENANCE: ICD-10-CM

## 2018-06-20 LAB
ALBUMIN SERPL BCP-MCNC: 3.9 G/DL
ALP SERPL-CCNC: 75 U/L
ALT SERPL W/O P-5'-P-CCNC: 21 U/L
ANION GAP SERPL CALC-SCNC: 7 MMOL/L
AST SERPL-CCNC: 19 U/L
BASOPHILS # BLD AUTO: 0.03 K/UL
BASOPHILS NFR BLD: 0.3 %
BILIRUB SERPL-MCNC: 0.6 MG/DL
BUN SERPL-MCNC: 9 MG/DL
CALCIUM SERPL-MCNC: 9.3 MG/DL
CHLORIDE SERPL-SCNC: 106 MMOL/L
CHOLEST SERPL-MCNC: 256 MG/DL
CHOLEST/HDLC SERPL: 6.7 {RATIO}
CO2 SERPL-SCNC: 27 MMOL/L
CREAT SERPL-MCNC: 0.9 MG/DL
DIFFERENTIAL METHOD: ABNORMAL
EOSINOPHIL # BLD AUTO: 0.1 K/UL
EOSINOPHIL NFR BLD: 1.1 %
ERYTHROCYTE [DISTWIDTH] IN BLOOD BY AUTOMATED COUNT: 12.5 %
EST. GFR  (AFRICAN AMERICAN): >60 ML/MIN/1.73 M^2
EST. GFR  (NON AFRICAN AMERICAN): >60 ML/MIN/1.73 M^2
GLUCOSE SERPL-MCNC: 92 MG/DL
HCT VFR BLD AUTO: 46.7 %
HDLC SERPL-MCNC: 38 MG/DL
HDLC SERPL: 14.8 %
HGB BLD-MCNC: 15.3 G/DL
IMM GRANULOCYTES # BLD AUTO: 0.06 K/UL
IMM GRANULOCYTES NFR BLD AUTO: 0.5 %
LDLC SERPL CALC-MCNC: 187 MG/DL
LYMPHOCYTES # BLD AUTO: 3.4 K/UL
LYMPHOCYTES NFR BLD: 29.6 %
MCH RBC QN AUTO: 30.3 PG
MCHC RBC AUTO-ENTMCNC: 32.8 G/DL
MCV RBC AUTO: 93 FL
MONOCYTES # BLD AUTO: 0.8 K/UL
MONOCYTES NFR BLD: 7.1 %
NEUTROPHILS # BLD AUTO: 7 K/UL
NEUTROPHILS NFR BLD: 61.4 %
NONHDLC SERPL-MCNC: 218 MG/DL
NRBC BLD-RTO: 0 /100 WBC
PLATELET # BLD AUTO: 297 K/UL
PMV BLD AUTO: 11.6 FL
POTASSIUM SERPL-SCNC: 4.6 MMOL/L
PROT SERPL-MCNC: 7.4 G/DL
RBC # BLD AUTO: 5.05 M/UL
SODIUM SERPL-SCNC: 140 MMOL/L
TRIGL SERPL-MCNC: 155 MG/DL
TSH SERPL DL<=0.005 MIU/L-ACNC: 1.15 UIU/ML
WBC # BLD AUTO: 11.47 K/UL

## 2018-06-20 PROCEDURE — 84443 ASSAY THYROID STIM HORMONE: CPT

## 2018-06-20 PROCEDURE — 80053 COMPREHEN METABOLIC PANEL: CPT

## 2018-06-20 PROCEDURE — 80061 LIPID PANEL: CPT

## 2018-06-20 PROCEDURE — 36415 COLL VENOUS BLD VENIPUNCTURE: CPT | Mod: PO

## 2018-06-20 PROCEDURE — 85025 COMPLETE CBC W/AUTO DIFF WBC: CPT

## 2018-06-28 ENCOUNTER — PATIENT MESSAGE (OUTPATIENT)
Dept: INTERNAL MEDICINE | Facility: CLINIC | Age: 53
End: 2018-06-28

## 2018-07-06 ENCOUNTER — HOSPITAL ENCOUNTER (EMERGENCY)
Facility: HOSPITAL | Age: 53
Discharge: HOME | End: 2018-07-06
Attending: EMERGENCY MEDICINE
Payer: COMMERCIAL

## 2018-07-06 VITALS
RESPIRATION RATE: 23 BRPM | SYSTOLIC BLOOD PRESSURE: 126 MMHG | WEIGHT: 180 LBS | TEMPERATURE: 98 F | HEART RATE: 70 BPM | OXYGEN SATURATION: 97 % | DIASTOLIC BLOOD PRESSURE: 67 MMHG | HEIGHT: 69 IN | BODY MASS INDEX: 26.66 KG/M2

## 2018-07-06 DIAGNOSIS — R11.0 NAUSEA: Primary | ICD-10-CM

## 2018-07-06 DIAGNOSIS — R53.1 GENERALIZED WEAKNESS: ICD-10-CM

## 2018-07-06 LAB
ALBUMIN SERPL BCP-MCNC: 4.1 G/DL
ALP SERPL-CCNC: 73 U/L
ALT SERPL W/O P-5'-P-CCNC: 29 U/L
ANION GAP SERPL CALC-SCNC: 12 MMOL/L
AST SERPL-CCNC: 20 U/L
BASOPHILS # BLD AUTO: 0.01 K/UL
BASOPHILS NFR BLD: 0.1 %
BILIRUB SERPL-MCNC: 0.5 MG/DL
BILIRUB UR QL STRIP: NEGATIVE
BNP SERPL-MCNC: <10 PG/ML
BUN SERPL-MCNC: 9 MG/DL
CALCIUM SERPL-MCNC: 9.9 MG/DL
CHLORIDE SERPL-SCNC: 103 MMOL/L
CLARITY UR: CLEAR
CO2 SERPL-SCNC: 24 MMOL/L
COLOR UR: YELLOW
CREAT SERPL-MCNC: 0.9 MG/DL
DIFFERENTIAL METHOD: ABNORMAL
EOSINOPHIL # BLD AUTO: 0 K/UL
EOSINOPHIL NFR BLD: 0.2 %
ERYTHROCYTE [DISTWIDTH] IN BLOOD BY AUTOMATED COUNT: 12.8 %
EST. GFR  (AFRICAN AMERICAN): >60 ML/MIN/1.73 M^2
EST. GFR  (NON AFRICAN AMERICAN): >60 ML/MIN/1.73 M^2
GLUCOSE SERPL-MCNC: 121 MG/DL
GLUCOSE UR QL STRIP: NEGATIVE
HCT VFR BLD AUTO: 44.9 %
HGB BLD-MCNC: 15.4 G/DL
HGB UR QL STRIP: NEGATIVE
KETONES UR QL STRIP: ABNORMAL
LEUKOCYTE ESTERASE UR QL STRIP: NEGATIVE
LIPASE SERPL-CCNC: 13 U/L
LYMPHOCYTES # BLD AUTO: 2.7 K/UL
LYMPHOCYTES NFR BLD: 16.6 %
MCH RBC QN AUTO: 30.6 PG
MCHC RBC AUTO-ENTMCNC: 34.3 G/DL
MCV RBC AUTO: 89 FL
MONOCYTES # BLD AUTO: 0.7 K/UL
MONOCYTES NFR BLD: 4.6 %
NEUTROPHILS # BLD AUTO: 12.6 K/UL
NEUTROPHILS NFR BLD: 78.5 %
NITRITE UR QL STRIP: NEGATIVE
PH UR STRIP: 6 [PH] (ref 5–8)
PLATELET # BLD AUTO: 282 K/UL
PMV BLD AUTO: 10.1 FL
POTASSIUM SERPL-SCNC: 3.9 MMOL/L
PROT SERPL-MCNC: 7.3 G/DL
PROT UR QL STRIP: NEGATIVE
RBC # BLD AUTO: 5.04 M/UL
SODIUM SERPL-SCNC: 139 MMOL/L
SP GR UR STRIP: <=1.005 (ref 1–1.03)
TROPONIN I SERPL DL<=0.01 NG/ML-MCNC: <0.006 NG/ML
URN SPEC COLLECT METH UR: ABNORMAL
UROBILINOGEN UR STRIP-ACNC: NEGATIVE EU/DL
WBC # BLD AUTO: 16.02 K/UL

## 2018-07-06 PROCEDURE — 81003 URINALYSIS AUTO W/O SCOPE: CPT

## 2018-07-06 PROCEDURE — 63600175 PHARM REV CODE 636 W HCPCS: Performed by: EMERGENCY MEDICINE

## 2018-07-06 PROCEDURE — 84484 ASSAY OF TROPONIN QUANT: CPT

## 2018-07-06 PROCEDURE — 99284 EMERGENCY DEPT VISIT MOD MDM: CPT | Mod: 25

## 2018-07-06 PROCEDURE — 93005 ELECTROCARDIOGRAM TRACING: CPT

## 2018-07-06 PROCEDURE — 36415 COLL VENOUS BLD VENIPUNCTURE: CPT

## 2018-07-06 PROCEDURE — 83690 ASSAY OF LIPASE: CPT

## 2018-07-06 PROCEDURE — 80053 COMPREHEN METABOLIC PANEL: CPT

## 2018-07-06 PROCEDURE — 25000003 PHARM REV CODE 250: Performed by: EMERGENCY MEDICINE

## 2018-07-06 PROCEDURE — 83880 ASSAY OF NATRIURETIC PEPTIDE: CPT

## 2018-07-06 PROCEDURE — 93010 ELECTROCARDIOGRAM REPORT: CPT | Mod: ,,, | Performed by: INTERNAL MEDICINE

## 2018-07-06 PROCEDURE — 96360 HYDRATION IV INFUSION INIT: CPT

## 2018-07-06 PROCEDURE — 85025 COMPLETE CBC W/AUTO DIFF WBC: CPT

## 2018-07-06 RX ORDER — ONDANSETRON 4 MG/1
4 TABLET, FILM COATED ORAL EVERY 8 HOURS PRN
Qty: 12 TABLET | Refills: 0 | Status: SHIPPED | OUTPATIENT
Start: 2018-07-06 | End: 2019-03-19

## 2018-07-06 RX ORDER — ONDANSETRON 2 MG/ML
4 INJECTION INTRAMUSCULAR; INTRAVENOUS
Status: COMPLETED | OUTPATIENT
Start: 2018-07-06 | End: 2018-07-06

## 2018-07-06 RX ORDER — ASPIRIN 325 MG
325 TABLET ORAL
Status: COMPLETED | OUTPATIENT
Start: 2018-07-06 | End: 2018-07-06

## 2018-07-06 RX ADMIN — ASPIRIN 325 MG ORAL TABLET 325 MG: 325 PILL ORAL at 05:07

## 2018-07-06 RX ADMIN — ONDANSETRON 4 MG: 2 INJECTION INTRAMUSCULAR; INTRAVENOUS at 05:07

## 2018-07-06 RX ADMIN — SODIUM CHLORIDE 500 ML: 0.9 INJECTION, SOLUTION INTRAVENOUS at 05:07

## 2018-07-06 NOTE — ED NOTES
"Pt states has had several bowel movement this am but not diarrhea  C/o nausea with no vomiting  Tearful.  States feels like I'm "not right"  "

## 2018-07-06 NOTE — ED PROVIDER NOTES
"SCRIBE #1 NOTE: I, Nia Duffy, am scribing for, and in the presence of, Subhash Griffith Jr., MD. I have scribed the HPI, ROS, and PEx of the note.     SCRIBE #2 NOTE: I, Monica Lee, am scribing for, and in the presence of,  Mary Jo Jesus MD. I have scribed the remaining portions of the note not scribed by Scribe #1.     History      Chief Complaint   Patient presents with    Nausea     States feels bad and weakness       Review of patient's allergies indicates:   Allergen Reactions    Augmentin [amoxicillin-pot clavulanate]      Bad yeast infxn. ;she prefers other abx        HPI   HPI    7/6/2018, 5:00 AM   History obtained from the patient      History of Present Illness: Vera Greer is a 53 y.o. female patient who presents to the Emergency Department for nausea and generalized weakness. Symptoms are constant and moderate in severity. Pt reports waking up at 1am with sxs. Pt reports she is supposed to be on a flight at 7am to go to [place] to see her sister who was in a car accident. Pt reports, "I feel like something isn't right". No mitigating or exacerbating factors reported. Associated sxs include nausea. Patient denies any chest pain or pressure, headache, neck pain, numbness or weakness, SOB, abd pain, dizziness, fever, chills, diarrhea, and all other sxs at this time. Prior Tx includes pepto bismol around 2 am with mild relief, denies eating anything out of the ordinary. No further complaints or concerns at this time.         Arrival mode: Personal vehicle      PCP: Taz Cartagena MD       Past Medical History:  Past Medical History:   Diagnosis Date    Anxiety     Chronic edema     aldactone and for hirsutism?    Depression     GERD (gastroesophageal reflux disease)     Hyperlipidemia     Hypothyroid     Irritable bowel syndrome     Sleep-related bruxism     chronic benzo x years       Past Surgical History:  Past Surgical History:   Procedure Laterality Date    ADENOIDECTOMY   "    APPENDECTOMY      BELT ABDOMINOPLASTY      BREAST SURGERY      reduction     CHOLECYSTECTOMY      COLONOSCOPY N/A 4/27/2016    Procedure: COLONOSCOPY;  Surgeon: Lisbeth Ruth MD;  Location: [facility];  Service: Endoscopy;  Laterality: N/A;    HYSTERECTOMY      partial     TONSILLECTOMY           Family History:  Family History   Problem Relation Age of Onset    Arthritis Mother     Heart disease Father     Hypertension Father     Colon cancer Neg Hx        Social History:  Social History     Social History Main Topics    Smoking status: Never Smoker    Smokeless tobacco: Never Used    Alcohol use 0.0 oz/week      Comment: once a year     Drug use: No    Sexual activity: Yes     Partners: Male     Birth control/ protection: Surgical       ROS   Review of Systems   Constitutional: Negative for chills and fever.   HENT: Negative for sore throat.    Respiratory: Negative for cough, chest tightness and shortness of breath.    Cardiovascular: Negative for chest pain.   Gastrointestinal: Positive for nausea. Negative for abdominal pain, diarrhea and vomiting.   Genitourinary: Negative for dysuria.   Musculoskeletal: Negative for back pain and neck pain.   Skin: Negative for rash.   Neurological: Negative for dizziness, syncope, weakness, numbness and headaches.   Hematological: Does not bruise/bleed easily.   All other systems reviewed and are negative.      Physical Exam      Initial Vitals [07/06/18 0424]   BP Pulse Resp Temp SpO2   133/86 72 18 98.3 °F (36.8 °C) 98 %      MAP       --          Physical Exam  Nursing Notes and Vital Signs Reviewed.  Constitutional: Patient is in no acute distress. Well-developed and well-nourished.  Head: Atraumatic. Normocephalic.  Eyes: PERRL. EOM intact. Conjunctivae are not pale. No scleral icterus.  ENT: Mucous membranes are moist. Oropharynx is clear and symmetric.    Neck: Supple. Full ROM. No lymphadenopathy.  Cardiovascular: Regular rate. Regular  "rhythm. No murmurs, rubs, or gallops. Distal pulses are 2+ and symmetric.  Pulmonary/Chest: No respiratory distress. Clear to auscultation bilaterally. No wheezing or rales.  Abdominal: Soft and non-distended.  There is no tenderness.  No rebound, guarding, or rigidity. Good bowel sounds.  Genitourinary: No CVA tenderness  Musculoskeletal: Moves all extremities. No obvious deformities. No edema. No calf tenderness.  Skin: Warm and dry.  Neurological:  Alert, awake, and appropriate.  Normal speech.  No acute focal neurological deficits are appreciated.  Psychiatric: Normal affect. Good eye contact. Appropriate in content.    ED Course    Procedures  ED Vital Signs:  Vitals:    07/06/18 0424 07/06/18 0442 07/06/18 0455 07/06/18 0535   BP: 133/86  136/86 (!) 107/57   Pulse: 72 72 75 63   Resp: 18  (!) 8 18   Temp: 98.3 °F (36.8 °C)      TempSrc: Oral      SpO2: 98%  97% 97%   Weight: 81.6 kg (180 lb)      Height: 5' 9" (1.753 m)       07/06/18 0602   BP: 126/67   Pulse: 70   Resp: (!) 23   Temp:    TempSrc:    SpO2: 97%   Weight:    Height:        Abnormal Lab Results:  Labs Reviewed   CBC W/ AUTO DIFFERENTIAL - Abnormal; Notable for the following:        Result Value    WBC 16.02 (*)     Gran # (ANC) 12.6 (*)     Gran% 78.5 (*)     Lymph% 16.6 (*)     All other components within normal limits   COMPREHENSIVE METABOLIC PANEL - Abnormal; Notable for the following:     Glucose 121 (*)     All other components within normal limits   URINALYSIS - Abnormal; Notable for the following:     Specific Gravity, UA <=1.005 (*)     Ketones, UA Trace (*)     All other components within normal limits   TROPONIN I   B-TYPE NATRIURETIC PEPTIDE   LIPASE        All Lab Results:  Results for orders placed or performed during the hospital encounter of 07/06/18   CBC auto differential   Result Value Ref Range    WBC 16.02 (H) 3.90 - 12.70 K/uL    RBC 5.04 4.00 - 5.40 M/uL    Hemoglobin 15.4 12.0 - 16.0 g/dL    Hematocrit 44.9 37.0 - 48.5 % "    MCV 89 82 - 98 fL    MCH 30.6 27.0 - 31.0 pg    MCHC 34.3 32.0 - 36.0 g/dL    RDW 12.8 11.5 - 14.5 %    Platelets 282 150 - 350 K/uL    MPV 10.1 9.2 - 12.9 fL    Gran # (ANC) 12.6 (H) 1.8 - 7.7 K/uL    Lymph # 2.7 1.0 - 4.8 K/uL    Mono # 0.7 0.3 - 1.0 K/uL    Eos # 0.0 0.0 - 0.5 K/uL    Baso # 0.01 0.00 - 0.20 K/uL    Gran% 78.5 (H) 38.0 - 73.0 %    Lymph% 16.6 (L) 18.0 - 48.0 %    Mono% 4.6 4.0 - 15.0 %    Eosinophil% 0.2 0.0 - 8.0 %    Basophil% 0.1 0.0 - 1.9 %    Differential Method Automated    Comprehensive metabolic panel   Result Value Ref Range    Sodium 139 136 - 145 mmol/L    Potassium 3.9 3.5 - 5.1 mmol/L    Chloride 103 95 - 110 mmol/L    CO2 24 23 - 29 mmol/L    Glucose 121 (H) 70 - 110 mg/dL    BUN, Bld 9 6 - 20 mg/dL    Creatinine 0.9 0.5 - 1.4 mg/dL    Calcium 9.9 8.7 - 10.5 mg/dL    Total Protein 7.3 6.0 - 8.4 g/dL    Albumin 4.1 3.5 - 5.2 g/dL    Total Bilirubin 0.5 0.1 - 1.0 mg/dL    Alkaline Phosphatase 73 55 - 135 U/L    AST 20 10 - 40 U/L    ALT 29 10 - 44 U/L    Anion Gap 12 8 - 16 mmol/L    eGFR if African American >60 >60 mL/min/1.73 m^2    eGFR if non African American >60 >60 mL/min/1.73 m^2   Troponin I #1   Result Value Ref Range    Troponin I <0.006 0.000 - 0.026 ng/mL   B-Type natriuretic peptide (BNP)   Result Value Ref Range    BNP <10 0 - 99 pg/mL   Lipase   Result Value Ref Range    Lipase 13 4 - 60 U/L   Urinalysis Clean Catch   Result Value Ref Range    Specimen UA Urine, Clean Catch     Color, UA Yellow Yellow, Straw, Ilana    Appearance, UA Clear Clear    pH, UA 6.0 5.0 - 8.0    Specific Gravity, UA <=1.005 (A) 1.005 - 1.030    Protein, UA Negative Negative    Glucose, UA Negative Negative    Ketones, UA Trace (A) Negative    Bilirubin (UA) Negative Negative    Occult Blood UA Negative Negative    Nitrite, UA Negative Negative    Urobilinogen, UA Negative <2.0 EU/dL    Leukocytes, UA Negative Negative         Imaging Results:  Imaging Results          X-Ray Chest AP Portable  (In process)   No acute process               The EKG was ordered, reviewed, and independently interpreted by the ED provider.  Interpretation time: 5:05  Rate: 67 BPM  Rhythm: normal sinus rhythm  Interpretation: No acute ST&T changes. No STEMI.         The Emergency Provider reviewed the vital signs and test results, which are outlined above.    ED Discussion     6:00 AM: Dr. Griffith transfers care of pt to Dr. Harper, pending urine results.    6:25 AM: Re-evaluated pt. Pt is resting comfortably and is in no acute distress.  Pt states her sx have improved and she is not nauseated at this time, she denies any abdominal pain, chest pain or pressure, states she feels this was potentially anxiety related as she is about to gone from home for 3 weeks. Mild leukocytosis noted, likely stress response.  Discussed with pt all pertinent ED information and results. Discussed to pt dx and plan of tx. Gave pt all f/u and return to the ED instructions. All questions and concerns were addressed at this time. Pt expresses understanding of information and instructions, and is comfortable with plan to discharge. Pt is stable for discharge.        ED Medication(s):  Medications   aspirin tablet 325 mg (325 mg Oral Given 7/6/18 0516)   sodium chloride 0.9% bolus 500 mL (0 mLs Intravenous Stopped 7/6/18 0607)   ondansetron injection 4 mg (4 mg Intravenous Given 7/6/18 0516)       New Prescriptions    ONDANSETRON (ZOFRAN) 4 MG TABLET    Take 1 tablet (4 mg total) by mouth every 8 (eight) hours as needed.       Follow-up Information     Taz Cartagena MD. Schedule an appointment as soon as possible for a visit in 1 day.    Specialty:  Family Medicine  Why:  Return to the Emergency Room, If symptoms worsen  Contact information:  [address]  [place] [address] [phone]                     Medical Decision Making    Medical Decision Making:   Clinical Tests:   Lab Tests: Ordered and Reviewed  Radiological Study:  Ordered and Reviewed  Medical Tests: Ordered and Reviewed     Additional MDM:   Heart Score:    History:          Slightly suspicious.  ECG:             Normal  Age:               45-65 years  Risk factors: 1-2 risk factors  Troponin:       Less than or equal to normal limit  Final Score: 2           Scribe Attestation:   Scribe #1: I performed the above scribed service and the documentation accurately describes the services I performed. I attest to the accuracy of the note.    Attending:   Physician Attestation Statement for Scribe #1: I, Subhash Griffith Jr., MD, personally performed the services described in this documentation, as scribed by Nia Duffy, in my presence, and it is both accurate and complete.       Scribe Attestation:   Scribe #2: I performed the above scribed service and the documentation accurately describes the services I performed. I attest to the accuracy of the note.    Attending Attestation:           Physician Attestation for Scribe:    Physician Attestation Statement for Scribe #2: I, Mary Jo Jesus MD, reviewed documentation, as scribed by Monica Lee in my presence, and it is both accurate and complete. I also acknowledge and confirm the content of the note done by Scribe #1.          Clinical Impression       ICD-10-CM ICD-9-CM   1. Nausea R11.0 787.02   2. Generalized weakness R53.1 780.79       Disposition:   Disposition: Discharged  Condition: Stable         Mary Jo Jesus MD  07/06/18 0634

## 2019-01-15 ENCOUNTER — PATIENT MESSAGE (OUTPATIENT)
Dept: FAMILY MEDICINE | Facility: CLINIC | Age: 54
End: 2019-01-15

## 2019-01-15 DIAGNOSIS — R93.89 ABNORMAL CHEST X-RAY: Primary | ICD-10-CM

## 2019-01-24 ENCOUNTER — TELEPHONE (OUTPATIENT)
Dept: INTERNAL MEDICINE | Facility: CLINIC | Age: 54
End: 2019-01-24

## 2019-01-24 ENCOUNTER — HOSPITAL ENCOUNTER (OUTPATIENT)
Dept: RADIOLOGY | Facility: HOSPITAL | Age: 54
Discharge: HOME | End: 2019-01-24
Attending: FAMILY MEDICINE
Payer: COMMERCIAL

## 2019-01-24 ENCOUNTER — TELEPHONE (OUTPATIENT)
Dept: URGENT CARE | Facility: CLINIC | Age: 54
End: 2019-01-24

## 2019-01-24 DIAGNOSIS — R93.89 ABNORMAL FINDING ON IMAGING: ICD-10-CM

## 2019-01-24 DIAGNOSIS — R93.89 ABNORMAL CHEST X-RAY: ICD-10-CM

## 2019-01-24 DIAGNOSIS — R93.89 ABNORMAL FINDING ON IMAGING: Primary | ICD-10-CM

## 2019-01-24 PROCEDURE — 74018 XR KUB: ICD-10-PCS | Mod: 26,,, | Performed by: RADIOLOGY

## 2019-01-24 PROCEDURE — 74018 RADEX ABDOMEN 1 VIEW: CPT | Mod: TC

## 2019-01-24 PROCEDURE — 74018 RADEX ABDOMEN 1 VIEW: CPT | Mod: 26,,, | Performed by: RADIOLOGY

## 2019-01-24 NOTE — TELEPHONE ENCOUNTER
I s/w the pt and she verbalized understanding of results from x-ray but have other concerns and requested to speak with provider so I forwarded the call per the provider.//kah

## 2019-01-24 NOTE — TELEPHONE ENCOUNTER
----- Message from Saumya Edmondson sent at 1/24/2019  3:45 PM CST -----  Contact: Pt   Pt is calling regarding requesting to have nurse call pt. Pt. States that she as questions regarding X-ray results that was sent to her  .[phone] (home)     .Thank You  Keeley Edmondson

## 2019-01-25 ENCOUNTER — PATIENT MESSAGE (OUTPATIENT)
Dept: INTERNAL MEDICINE | Facility: CLINIC | Age: 54
End: 2019-01-25

## 2019-01-26 ENCOUNTER — PATIENT MESSAGE (OUTPATIENT)
Dept: INTERNAL MEDICINE | Facility: CLINIC | Age: 54
End: 2019-01-26

## 2019-02-06 DIAGNOSIS — K58.9 IRRITABLE BOWEL SYNDROME WITHOUT DIARRHEA: ICD-10-CM

## 2019-02-06 DIAGNOSIS — F32.A DEPRESSION, UNSPECIFIED DEPRESSION TYPE: ICD-10-CM

## 2019-02-06 RX ORDER — BUPROPION HYDROCHLORIDE 150 MG/1
150 TABLET ORAL DAILY
Qty: 90 TABLET | Refills: 3 | Status: SHIPPED | OUTPATIENT
Start: 2019-02-06 | End: 2019-06-24

## 2019-02-06 RX ORDER — HYOSCYAMINE SULFATE 0.38 MG/1
0.38 TABLET, EXTENDED RELEASE ORAL EVERY 12 HOURS PRN
Qty: 90 TABLET | Refills: 3 | Status: SHIPPED | OUTPATIENT
Start: 2019-02-06 | End: 2019-02-08 | Stop reason: SDUPTHER

## 2019-02-06 RX ORDER — SPIRONOLACTONE 100 MG/1
100 TABLET, FILM COATED ORAL DAILY
Qty: 90 TABLET | Refills: 3 | Status: SHIPPED | OUTPATIENT
Start: 2019-02-06 | End: 2019-06-24 | Stop reason: SDUPTHER

## 2019-02-07 DIAGNOSIS — R93.5 ABNORMAL ABDOMINAL X-RAY: Primary | ICD-10-CM

## 2019-02-08 ENCOUNTER — PATIENT MESSAGE (OUTPATIENT)
Dept: INTERNAL MEDICINE | Facility: CLINIC | Age: 54
End: 2019-02-08

## 2019-02-08 ENCOUNTER — TELEPHONE (OUTPATIENT)
Dept: URGENT CARE | Facility: CLINIC | Age: 54
End: 2019-02-08

## 2019-02-08 DIAGNOSIS — K58.9 IRRITABLE BOWEL SYNDROME WITHOUT DIARRHEA: ICD-10-CM

## 2019-02-08 RX ORDER — HYOSCYAMINE SULFATE 0.38 MG/1
0.38 TABLET, EXTENDED RELEASE ORAL EVERY 12 HOURS PRN
Qty: 180 TABLET | Refills: 3 | Status: SHIPPED | OUTPATIENT
Start: 2019-02-08 | End: 2019-06-24 | Stop reason: SDUPTHER

## 2019-02-08 NOTE — TELEPHONE ENCOUNTER
I returned a call to the pt and she stated to disregard this message [facility] is no longer open . She will contact staff on Monday with the name of the place she would like to complete her CT at. I verbalized understanding./kah

## 2019-02-08 NOTE — TELEPHONE ENCOUNTER
----- Message from Mundo Cortes sent at 2/8/2019  2:03 PM CST -----  Contact: pt  She's calling in regards to her orders for a CT scan being sent to a [facility] in  [facility]# [phone],  out side of [facility], [phone] (home)

## 2019-02-11 ENCOUNTER — PATIENT MESSAGE (OUTPATIENT)
Dept: INTERNAL MEDICINE | Facility: CLINIC | Age: 54
End: 2019-02-11

## 2019-02-12 ENCOUNTER — TELEPHONE (OUTPATIENT)
Dept: RADIOLOGY | Facility: HOSPITAL | Age: 54
End: 2019-02-12

## 2019-02-17 ENCOUNTER — PATIENT MESSAGE (OUTPATIENT)
Dept: INTERNAL MEDICINE | Facility: CLINIC | Age: 54
End: 2019-02-17

## 2019-02-21 ENCOUNTER — TELEPHONE (OUTPATIENT)
Dept: INTERNAL MEDICINE | Facility: CLINIC | Age: 54
End: 2019-02-21

## 2019-02-21 DIAGNOSIS — R19.5 POSITIVE FIT (FECAL IMMUNOCHEMICAL TEST): Primary | ICD-10-CM

## 2019-02-21 NOTE — TELEPHONE ENCOUNTER
Needs gi appt      Note to me:She and I met this morning (not an official appt) and discussed her xray . Shows persistence of calcification. She asks whether she needs ct. I d./wd im not sure what it could be other than colon calcif or vascular calc. And that ct best way to find out . She will defer ct for now.     She mentions + fit kit via gyn. Had nl cscope 4/16. No abd sympt. Does have hx hemorr. w couple times blood in stool    Also mentions gyn did lab a1c 5.7 d/wd mild gluc intol and to avoid sugars

## 2019-03-01 ENCOUNTER — PATIENT MESSAGE (OUTPATIENT)
Dept: INTERNAL MEDICINE | Facility: CLINIC | Age: 54
End: 2019-03-01

## 2019-03-19 ENCOUNTER — OFFICE VISIT (OUTPATIENT)
Dept: INTERNAL MEDICINE | Facility: CLINIC | Age: 54
End: 2019-03-19
Payer: COMMERCIAL

## 2019-03-19 VITALS
DIASTOLIC BLOOD PRESSURE: 80 MMHG | TEMPERATURE: 100 F | SYSTOLIC BLOOD PRESSURE: 118 MMHG | BODY MASS INDEX: 27.98 KG/M2 | HEART RATE: 92 BPM | WEIGHT: 188.94 LBS | HEIGHT: 69 IN

## 2019-03-19 DIAGNOSIS — J06.9 UPPER RESPIRATORY TRACT INFECTION, UNSPECIFIED TYPE: Primary | ICD-10-CM

## 2019-03-19 PROCEDURE — 99999 PR PBB SHADOW E&M-EST. PATIENT-LVL IV: CPT | Mod: PBBFAC,,, | Performed by: PHYSICIAN ASSISTANT

## 2019-03-19 PROCEDURE — 99213 PR OFFICE/OUTPT VISIT, EST, LEVL III, 20-29 MIN: ICD-10-PCS | Mod: S$GLB,,, | Performed by: PHYSICIAN ASSISTANT

## 2019-03-19 PROCEDURE — 3079F DIAST BP 80-89 MM HG: CPT | Mod: CPTII,S$GLB,, | Performed by: PHYSICIAN ASSISTANT

## 2019-03-19 PROCEDURE — 99999 PR PBB SHADOW E&M-EST. PATIENT-LVL IV: ICD-10-PCS | Mod: PBBFAC,,, | Performed by: PHYSICIAN ASSISTANT

## 2019-03-19 PROCEDURE — 3074F PR MOST RECENT SYSTOLIC BLOOD PRESSURE < 130 MM HG: ICD-10-PCS | Mod: CPTII,S$GLB,, | Performed by: PHYSICIAN ASSISTANT

## 2019-03-19 PROCEDURE — 3008F BODY MASS INDEX DOCD: CPT | Mod: CPTII,S$GLB,, | Performed by: PHYSICIAN ASSISTANT

## 2019-03-19 PROCEDURE — 3074F SYST BP LT 130 MM HG: CPT | Mod: CPTII,S$GLB,, | Performed by: PHYSICIAN ASSISTANT

## 2019-03-19 PROCEDURE — 3079F PR MOST RECENT DIASTOLIC BLOOD PRESSURE 80-89 MM HG: ICD-10-PCS | Mod: CPTII,S$GLB,, | Performed by: PHYSICIAN ASSISTANT

## 2019-03-19 PROCEDURE — 3008F PR BODY MASS INDEX (BMI) DOCUMENTED: ICD-10-PCS | Mod: CPTII,S$GLB,, | Performed by: PHYSICIAN ASSISTANT

## 2019-03-19 PROCEDURE — 99213 OFFICE O/P EST LOW 20 MIN: CPT | Mod: S$GLB,,, | Performed by: PHYSICIAN ASSISTANT

## 2019-03-19 RX ORDER — CODEINE PHOSPHATE AND GUAIFENESIN 10; 100 MG/5ML; MG/5ML
5 SOLUTION ORAL 3 TIMES DAILY PRN
Qty: 118 ML | Refills: 0 | Status: SHIPPED | OUTPATIENT
Start: 2019-03-19 | End: 2019-03-29

## 2019-03-19 RX ORDER — ALBUTEROL SULFATE 90 UG/1
2 AEROSOL, METERED RESPIRATORY (INHALATION) EVERY 6 HOURS PRN
Qty: 18 G | Refills: 0 | Status: SHIPPED | OUTPATIENT
Start: 2019-03-19 | End: 2019-04-15 | Stop reason: SDUPTHER

## 2019-03-19 RX ORDER — CEPHALEXIN 500 MG/1
500 CAPSULE ORAL EVERY 12 HOURS
Qty: 20 CAPSULE | Refills: 0 | Status: SHIPPED | OUTPATIENT
Start: 2019-03-19 | End: 2019-03-29

## 2019-04-15 RX ORDER — ALBUTEROL SULFATE 90 UG/1
AEROSOL, METERED RESPIRATORY (INHALATION)
Qty: 8.5 INHALER | Refills: 5 | Status: SHIPPED | OUTPATIENT
Start: 2019-04-15 | End: 2020-06-25

## 2019-04-29 ENCOUNTER — OFFICE VISIT (OUTPATIENT)
Dept: DERMATOLOGY | Facility: CLINIC | Age: 54
End: 2019-04-29
Payer: COMMERCIAL

## 2019-04-29 DIAGNOSIS — L82.1 SEBORRHEIC KERATOSES: ICD-10-CM

## 2019-04-29 DIAGNOSIS — D22.9 MULTIPLE BENIGN NEVI: ICD-10-CM

## 2019-04-29 DIAGNOSIS — L29.9 SCALP PRURITUS: Primary | ICD-10-CM

## 2019-04-29 PROCEDURE — 99999 PR PBB SHADOW E&M-EST. PATIENT-LVL II: ICD-10-PCS | Mod: PBBFAC,,, | Performed by: PHYSICIAN ASSISTANT

## 2019-04-29 PROCEDURE — 99999 PR PBB SHADOW E&M-EST. PATIENT-LVL II: CPT | Mod: PBBFAC,,, | Performed by: PHYSICIAN ASSISTANT

## 2019-04-29 PROCEDURE — 99213 OFFICE O/P EST LOW 20 MIN: CPT | Mod: S$GLB,,, | Performed by: PHYSICIAN ASSISTANT

## 2019-04-29 PROCEDURE — 99213 PR OFFICE/OUTPT VISIT, EST, LEVL III, 20-29 MIN: ICD-10-PCS | Mod: S$GLB,,, | Performed by: PHYSICIAN ASSISTANT

## 2019-04-29 RX ORDER — CLOTRIMAZOLE AND BETAMETHASONE DIPROPIONATE 10; .64 MG/G; MG/G
CREAM TOPICAL
COMMUNITY
Start: 2019-04-29 | End: 2021-06-24

## 2019-04-29 RX ORDER — FLUOCINOLONE ACETONIDE 0.1 MG/ML
SOLUTION TOPICAL
Qty: 60 ML | Refills: 0 | Status: SHIPPED | OUTPATIENT
Start: 2019-04-29 | End: 2020-06-25

## 2019-04-29 NOTE — PROGRESS NOTES
Subjective:       Patient ID:  Vera Greer is a 54 y.o. female who presents for   Chief Complaint   Patient presents with    Spot     to abdomen x 3 weeks, no tx     Hx of SKs, benign nevi, perleche, and last seen by Dr. Orta 11/2017. Here today for new c/o.    History of Present Illness: The patient presents with chief complaint of spot.  Location: abdomen  Duration: 3 weeks  Signs/Symptoms: red [place]; denies itching or spreading; denies flaking or peeling    Prior treatments: none      Spot         Review of Systems   Constitutional: Negative for fever and chills.   Gastrointestinal: Negative for nausea and vomiting.   Skin: Positive for rash. Negative for itching, dry skin, daily sunscreen use, activity-related sunscreen use and recent sunburn.   Hematologic/Lymphatic: Does not bruise/bleed easily.        Objective:    Physical Exam   Constitutional: She appears well-developed and well-nourished. No distress.   Neurological: She is alert and oriented to person, place, and time. She is not disoriented.   Psychiatric: She has a normal mood and affect.   Skin:   Areas Examined (abnormalities noted in diagram):   Scalp / Hair Palpated and Inspected  Head / Face Inspection Performed  Neck Inspection Performed  Chest / Axilla Inspection Performed  Abdomen Inspection Performed  Genitals / Buttocks / Groin Inspection Performed  Back Inspection Performed  RUE Inspected  LUE Inspection Performed                   Diagram Legend     Erythematous scaling macule/papule c/w actinic keratosis       Vascular papule c/w angioma      Pigmented verrucoid papule/plaque c/w seborrheic keratosis      Yellow umbilicated papule c/w sebaceous hyperplasia      Irregularly shaped tan macule c/w lentigo     1-2 mm smooth white papules consistent with Milia      Movable subcutaneous cyst with punctum c/w epidermal inclusion cyst      Subcutaneous movable cyst c/w pilar cyst      Firm pink to brown papule c/w dermatofibroma       Pedunculated fleshy papule(s) c/w skin tag(s)      Evenly pigmented macule c/w junctional nevus     Mildly variegated pigmented, slightly irregular-bordered macule c/w mildly atypical nevus      Flesh colored to evenly pigmented papule c/w intradermal nevus       Pink pearly papule/plaque c/w basal cell carcinoma      Erythematous hyperkeratotic cursted plaque c/w SCC      Surgical scar with no sign of skin cancer recurrence      Open and closed comedones      Inflammatory papules and pustules      Verrucoid papule consistent consistent with wart     Erythematous eczematous patches and plaques     Dystrophic onycholytic nail with subungual debris c/w onychomycosis     Umbilicated papule    Erythematous-base heme-crusted tan verrucoid plaque consistent with inflamed seborrheic keratosis     Erythematous Silvery Scaling Plaque c/w Psoriasis     See annotation      Assessment / Plan:        Scalp pruritus  -     fluocinolone (SYNALAR) 0.01 % external solution; AAA of scalp twice a day prn itching.  Dispense: 60 mL; Refill: 0  Mild, will start above med. Consider ketoconazole shampoo or cream in future. F/u in 3- 6 months.     Seborrheic keratoses  Reassurance given.  Lesions are benign.  Reassurance given. Discussed diagnosis and that lesions are benign.  After risks, benefits and alternatives explained, including blistering, pain, hyper- and hypopigmentation, patient verbally consents to cryotherapy to benign lesions. One lesion of the abdomen was treated with cryotherapy.    Multiple benign nevi  Discussed ABCDEs of moles, daily sunscreen w/spf 30. Regular mole monitoring encouraged. Will schedule for FSE with MD in the next 2-3 months.         Follow up in about 3 months (around 7/29/2019) for to see Dermatology MD.

## 2019-06-08 ENCOUNTER — PATIENT MESSAGE (OUTPATIENT)
Dept: INTERNAL MEDICINE | Facility: CLINIC | Age: 54
End: 2019-06-08

## 2019-06-08 DIAGNOSIS — Z00.00 ROUTINE HEALTH MAINTENANCE: Primary | ICD-10-CM

## 2019-06-17 ENCOUNTER — TELEPHONE (OUTPATIENT)
Dept: URGENT CARE | Facility: CLINIC | Age: 54
End: 2019-06-17

## 2019-06-18 ENCOUNTER — LAB VISIT (OUTPATIENT)
Dept: LAB | Facility: HOSPITAL | Age: 54
End: 2019-06-18
Attending: FAMILY MEDICINE
Payer: COMMERCIAL

## 2019-06-18 DIAGNOSIS — Z00.00 ROUTINE HEALTH MAINTENANCE: ICD-10-CM

## 2019-06-18 LAB
ALBUMIN SERPL BCP-MCNC: 4 G/DL (ref 3.5–5.2)
ALP SERPL-CCNC: 72 U/L (ref 55–135)
ALT SERPL W/O P-5'-P-CCNC: 25 U/L (ref 10–44)
ANION GAP SERPL CALC-SCNC: 9 MMOL/L (ref 8–16)
AST SERPL-CCNC: 20 U/L (ref 10–40)
BASOPHILS # BLD AUTO: 0.03 K/UL (ref 0–0.2)
BASOPHILS NFR BLD: 0.2 % (ref 0–1.9)
BILIRUB SERPL-MCNC: 0.3 MG/DL (ref 0.1–1)
BUN SERPL-MCNC: 12 MG/DL (ref 6–20)
CALCIUM SERPL-MCNC: 9.6 MG/DL (ref 8.7–10.5)
CHLORIDE SERPL-SCNC: 106 MMOL/L (ref 95–110)
CHOLEST SERPL-MCNC: 228 MG/DL (ref 120–199)
CHOLEST/HDLC SERPL: 6.2 {RATIO} (ref 2–5)
CO2 SERPL-SCNC: 25 MMOL/L (ref 23–29)
CREAT SERPL-MCNC: 0.8 MG/DL (ref 0.5–1.4)
DIFFERENTIAL METHOD: ABNORMAL
EOSINOPHIL # BLD AUTO: 0.2 K/UL (ref 0–0.5)
EOSINOPHIL NFR BLD: 1.3 % (ref 0–8)
ERYTHROCYTE [DISTWIDTH] IN BLOOD BY AUTOMATED COUNT: 12.3 % (ref 11.5–14.5)
EST. GFR  (AFRICAN AMERICAN): >60 ML/MIN/1.73 M^2
EST. GFR  (NON AFRICAN AMERICAN): >60 ML/MIN/1.73 M^2
ESTIMATED AVG GLUCOSE: 120 MG/DL (ref 68–131)
GLUCOSE SERPL-MCNC: 85 MG/DL (ref 70–110)
HBA1C MFR BLD HPLC: 5.8 % (ref 4–5.6)
HCT VFR BLD AUTO: 45.9 % (ref 37–48.5)
HDLC SERPL-MCNC: 37 MG/DL (ref 40–75)
HDLC SERPL: 16.2 % (ref 20–50)
HGB BLD-MCNC: 15 G/DL (ref 12–16)
IMM GRANULOCYTES # BLD AUTO: 0.05 K/UL (ref 0–0.04)
IMM GRANULOCYTES NFR BLD AUTO: 0.4 % (ref 0–0.5)
LDLC SERPL CALC-MCNC: 155 MG/DL (ref 63–159)
LYMPHOCYTES # BLD AUTO: 4 K/UL (ref 1–4.8)
LYMPHOCYTES NFR BLD: 31.1 % (ref 18–48)
MCH RBC QN AUTO: 29.9 PG (ref 27–31)
MCHC RBC AUTO-ENTMCNC: 32.7 G/DL (ref 32–36)
MCV RBC AUTO: 92 FL (ref 82–98)
MONOCYTES # BLD AUTO: 0.9 K/UL (ref 0.3–1)
MONOCYTES NFR BLD: 7.3 % (ref 4–15)
NEUTROPHILS # BLD AUTO: 7.6 K/UL (ref 1.8–7.7)
NEUTROPHILS NFR BLD: 59.7 % (ref 38–73)
NONHDLC SERPL-MCNC: 191 MG/DL
NRBC BLD-RTO: 0 /100 WBC
PLATELET # BLD AUTO: 339 K/UL (ref 150–350)
PMV BLD AUTO: 11 FL (ref 9.2–12.9)
POTASSIUM SERPL-SCNC: 4 MMOL/L (ref 3.5–5.1)
PROT SERPL-MCNC: 7.5 G/DL (ref 6–8.4)
RBC # BLD AUTO: 5.01 M/UL (ref 4–5.4)
SODIUM SERPL-SCNC: 140 MMOL/L (ref 136–145)
TRIGL SERPL-MCNC: 180 MG/DL (ref 30–150)
TSH SERPL DL<=0.005 MIU/L-ACNC: 0.67 UIU/ML (ref 0.4–4)
WBC # BLD AUTO: 12.81 K/UL (ref 3.9–12.7)

## 2019-06-18 PROCEDURE — 84443 ASSAY THYROID STIM HORMONE: CPT

## 2019-06-18 PROCEDURE — 85025 COMPLETE CBC W/AUTO DIFF WBC: CPT

## 2019-06-18 PROCEDURE — 83036 HEMOGLOBIN GLYCOSYLATED A1C: CPT

## 2019-06-18 PROCEDURE — 80053 COMPREHEN METABOLIC PANEL: CPT

## 2019-06-18 PROCEDURE — 36415 COLL VENOUS BLD VENIPUNCTURE: CPT

## 2019-06-18 PROCEDURE — 80061 LIPID PANEL: CPT

## 2019-06-24 ENCOUNTER — OFFICE VISIT (OUTPATIENT)
Dept: INTERNAL MEDICINE | Facility: CLINIC | Age: 54
End: 2019-06-24
Payer: COMMERCIAL

## 2019-06-24 VITALS
HEIGHT: 69 IN | TEMPERATURE: 98 F | BODY MASS INDEX: 28.24 KG/M2 | DIASTOLIC BLOOD PRESSURE: 80 MMHG | SYSTOLIC BLOOD PRESSURE: 110 MMHG | WEIGHT: 190.69 LBS

## 2019-06-24 DIAGNOSIS — F32.A DEPRESSION, UNSPECIFIED DEPRESSION TYPE: ICD-10-CM

## 2019-06-24 DIAGNOSIS — D72.829 LEUKOCYTOSIS, UNSPECIFIED TYPE: ICD-10-CM

## 2019-06-24 DIAGNOSIS — Z00.00 ROUTINE HEALTH MAINTENANCE: Primary | ICD-10-CM

## 2019-06-24 DIAGNOSIS — K58.9 IRRITABLE BOWEL SYNDROME WITHOUT DIARRHEA: ICD-10-CM

## 2019-06-24 PROCEDURE — 3074F PR MOST RECENT SYSTOLIC BLOOD PRESSURE < 130 MM HG: ICD-10-PCS | Mod: CPTII,S$GLB,, | Performed by: FAMILY MEDICINE

## 2019-06-24 PROCEDURE — 99396 PREV VISIT EST AGE 40-64: CPT | Mod: S$GLB,,, | Performed by: FAMILY MEDICINE

## 2019-06-24 PROCEDURE — 99396 PR PREVENTIVE VISIT,EST,40-64: ICD-10-PCS | Mod: S$GLB,,, | Performed by: FAMILY MEDICINE

## 2019-06-24 PROCEDURE — 99999 PR PBB SHADOW E&M-EST. PATIENT-LVL III: CPT | Mod: PBBFAC,,, | Performed by: FAMILY MEDICINE

## 2019-06-24 PROCEDURE — 3079F DIAST BP 80-89 MM HG: CPT | Mod: CPTII,S$GLB,, | Performed by: FAMILY MEDICINE

## 2019-06-24 PROCEDURE — 99999 PR PBB SHADOW E&M-EST. PATIENT-LVL III: ICD-10-PCS | Mod: PBBFAC,,, | Performed by: FAMILY MEDICINE

## 2019-06-24 PROCEDURE — 3079F PR MOST RECENT DIASTOLIC BLOOD PRESSURE 80-89 MM HG: ICD-10-PCS | Mod: CPTII,S$GLB,, | Performed by: FAMILY MEDICINE

## 2019-06-24 PROCEDURE — 3074F SYST BP LT 130 MM HG: CPT | Mod: CPTII,S$GLB,, | Performed by: FAMILY MEDICINE

## 2019-06-24 RX ORDER — SPIRONOLACTONE 100 MG/1
100 TABLET, FILM COATED ORAL DAILY
Qty: 90 TABLET | Refills: 3 | Status: SHIPPED | OUTPATIENT
Start: 2019-06-24 | End: 2020-05-06 | Stop reason: SDUPTHER

## 2019-06-24 RX ORDER — HYOSCYAMINE SULFATE 0.38 MG/1
0.38 TABLET, EXTENDED RELEASE ORAL EVERY 12 HOURS PRN
Qty: 180 TABLET | Refills: 3 | Status: SHIPPED | OUTPATIENT
Start: 2019-06-24 | End: 2020-02-13 | Stop reason: SDUPTHER

## 2019-06-24 RX ORDER — BUPROPION HYDROCHLORIDE 150 MG/1
150 TABLET ORAL DAILY
Qty: 90 TABLET | Refills: 3 | Status: CANCELLED | OUTPATIENT
Start: 2019-06-24

## 2019-06-24 RX ORDER — BUPROPION HYDROCHLORIDE 75 MG/1
75 TABLET ORAL DAILY
Qty: 30 TABLET | Refills: 11 | Status: SHIPPED | OUTPATIENT
Start: 2019-06-24 | End: 2020-02-13 | Stop reason: SDUPTHER

## 2019-06-24 NOTE — PATIENT INSTRUCTIONS
Shingrix new shingles vaccine  via a pharmacy  Try decrease wellbutrin from 150 to 75 mg  Can try when ready taking 75 mg every other days for a few weeks then every few days few weeks then try stopping

## 2019-06-25 NOTE — PROGRESS NOTES
Subjective:       Patient ID: Vera Greer is a . female.    Chief Complaint: here for physical examination and issues  below    HPIdoing well no c/o  Hx hypothy off med and nl tsh  infreq migraine needs rf fiornal    elev chol : prev onstatin but no longer. cv risk not elev d/wd no longer needs statin    D/wd elev wbc. Had viral illness around same time lab;feels ok now    Review of Systems  Cardiovascular: no chest pain  Chest: no shortness of breath  Abd: no abd pain  Remainder review of systems negative    Objective:      Physical Exam   Constitutional: She is oriented to person, place, and time. She appears well-developed and well-nourished. No distress.   HENT:   Head: Atraumatic.   Right Ear: External ear normal.   Left Ear: External ear normal.   Nose: Nose normal.   Mouth/Throat: Oropharynx is clear and moist. No oropharyngeal exudate.   bilat tms nl   Eyes: Conjunctivae and EOM are normal. Pupils are equal, round, and reactive to light. No scleral icterus.   Neck: Normal range of motion. Neck supple. No thyromegaly present.   Cardiovascular: Normal rate, regular rhythm and normal heart sounds.    No murmur heard.  Pulmonary/Chest: Effort normal and breath sounds normal. No respiratory distress. She has no wheezes. She has no rales.   Abdominal: Soft. Bowel sounds are normal. She exhibits no distension and no mass. There is no hepatosplenomegaly. There is no tenderness. There is no rebound and no guarding.   Musculoskeletal: Normal range of motion. She exhibits no edema or tenderness.   Lymphadenopathy:     She has no cervical adenopathy.   Neurological: She is alert and oriented to person, place, and time. No cranial nerve deficit. She exhibits normal muscle tone. Coordination normal.   Skin: Skin is warm. No rash noted. No erythema. No pallor.   Psychiatric: She has a normal mood and affect. Her behavior is normal. Judgment and thought content normal.   Nursing note and vitals reviewed.       Assessment:       1. Routine health maintenance       elev wbc  Plan:     Lab 12 months and follow up after  Routine health maintenance  -     Comprehensive metabolic panel; Future; Expected date: 06/23/2020  -     CBC auto differential; Future; Expected date: 06/24/2020  -     Lipid panel; Future; Expected date: 06/23/2020  -     TSH; Future; Expected date: 06/23/2020    Irritable bowel syndrome without diarrhea  -     hyoscyamine (LEVBID) 0.375 mg Tb12; Take 1 tablet (0.375 mg total) by mouth every 12 (twelve) hours as needed.  Dispense: 180 tablet; Refill: 3    Depression, unspecified depression type    Leukocytosis, unspecified type  -     CBC auto differential; Future; Expected date: 07/24/2019    Other orders  -     spironolactone (ALDACTONE) 100 MG tablet; Take 1 tablet (100 mg total) by mouth once daily.  Dispense: 90 tablet; Refill: 3  -     buPROPion (WELLBUTRIN) 75 MG tablet; Take 1 tablet (75 mg total) by mouth once daily.  Dispense: 30 tablet; Refill: 11    Shingrix new shingles vaccine  via a pharmacy  Try decrease wellbutrin from 150 to 75 mg  Can try when ready taking 75 mg every other days for a few weeks then every few days few weeks then try stopping      Also cbc 1 month

## 2019-07-05 ENCOUNTER — PATIENT MESSAGE (OUTPATIENT)
Dept: INTERNAL MEDICINE | Facility: CLINIC | Age: 54
End: 2019-07-05

## 2019-07-09 ENCOUNTER — TELEPHONE (OUTPATIENT)
Dept: INTERNAL MEDICINE | Facility: CLINIC | Age: 54
End: 2019-07-09

## 2019-07-09 DIAGNOSIS — M25.519 SHOULDER PAIN, UNSPECIFIED CHRONICITY, UNSPECIFIED LATERALITY: Primary | ICD-10-CM

## 2019-07-09 NOTE — TELEPHONE ENCOUNTER
Detail Level: Detailed Ortho refer should pain   Post-Care Instructions: I reviewed with the patient in detail post-care instructions. Patient is to wear sunprotection, and avoid picking at any of the treated lesions. Pt may apply Vaseline to crusted or scabbing areas. Render Post-Care Instructions In Note?: no Consent: The patient's consent was obtained including but not limited to risks of crusting, scabbing, blistering, scarring, darker or lighter pigmentary change, recurrence, incomplete removal and infection. Number Of Freeze-Thaw Cycles: 1 freeze-thaw cycle Duration Of Freeze Thaw-Cycle (Seconds): 0

## 2019-07-10 DIAGNOSIS — M25.511 RIGHT SHOULDER PAIN, UNSPECIFIED CHRONICITY: Primary | ICD-10-CM

## 2019-07-10 NOTE — TELEPHONE ENCOUNTER
I called and informed the pt that the provider  has placed a referral for Ortho and someone from that dept will contact her for scheduling however, if she does not here anything by the end of this week to call the main number here and ask for Ortho and she can get them to schedule. //kah

## 2019-07-11 ENCOUNTER — HOSPITAL ENCOUNTER (OUTPATIENT)
Dept: RADIOLOGY | Facility: HOSPITAL | Age: 54
Discharge: HOME | End: 2019-07-11
Attending: PHYSICIAN ASSISTANT
Payer: COMMERCIAL

## 2019-07-11 ENCOUNTER — OFFICE VISIT (OUTPATIENT)
Dept: ORTHOPEDICS | Facility: CLINIC | Age: 54
End: 2019-07-11
Payer: COMMERCIAL

## 2019-07-11 VITALS
HEIGHT: 69 IN | HEART RATE: 81 BPM | DIASTOLIC BLOOD PRESSURE: 81 MMHG | WEIGHT: 190 LBS | SYSTOLIC BLOOD PRESSURE: 132 MMHG | BODY MASS INDEX: 28.14 KG/M2

## 2019-07-11 DIAGNOSIS — M75.81 ROTATOR CUFF TENDONITIS, RIGHT: Primary | ICD-10-CM

## 2019-07-11 DIAGNOSIS — M25.511 ACUTE PAIN OF RIGHT SHOULDER: ICD-10-CM

## 2019-07-11 DIAGNOSIS — M25.511 RIGHT SHOULDER PAIN, UNSPECIFIED CHRONICITY: ICD-10-CM

## 2019-07-11 PROCEDURE — 20610 PR DRAIN/INJECT LARGE JOINT/BURSA: ICD-10-PCS | Mod: RT,S$GLB,, | Performed by: PHYSICIAN ASSISTANT

## 2019-07-11 PROCEDURE — 3008F PR BODY MASS INDEX (BMI) DOCUMENTED: ICD-10-PCS | Mod: CPTII,S$GLB,, | Performed by: PHYSICIAN ASSISTANT

## 2019-07-11 PROCEDURE — 99204 PR OFFICE/OUTPT VISIT, NEW, LEVL IV, 45-59 MIN: ICD-10-PCS | Mod: 25,S$GLB,, | Performed by: PHYSICIAN ASSISTANT

## 2019-07-11 PROCEDURE — 73030 X-RAY EXAM OF SHOULDER: CPT | Mod: 26,RT,, | Performed by: RADIOLOGY

## 2019-07-11 PROCEDURE — 3079F PR MOST RECENT DIASTOLIC BLOOD PRESSURE 80-89 MM HG: ICD-10-PCS | Mod: CPTII,S$GLB,, | Performed by: PHYSICIAN ASSISTANT

## 2019-07-11 PROCEDURE — 20610 DRAIN/INJ JOINT/BURSA W/O US: CPT | Mod: RT,S$GLB,, | Performed by: PHYSICIAN ASSISTANT

## 2019-07-11 PROCEDURE — 3079F DIAST BP 80-89 MM HG: CPT | Mod: CPTII,S$GLB,, | Performed by: PHYSICIAN ASSISTANT

## 2019-07-11 PROCEDURE — 99204 OFFICE O/P NEW MOD 45 MIN: CPT | Mod: 25,S$GLB,, | Performed by: PHYSICIAN ASSISTANT

## 2019-07-11 PROCEDURE — 73030 X-RAY EXAM OF SHOULDER: CPT | Mod: TC,RT

## 2019-07-11 PROCEDURE — 3075F PR MOST RECENT SYSTOLIC BLOOD PRESS GE 130-139MM HG: ICD-10-PCS | Mod: CPTII,S$GLB,, | Performed by: PHYSICIAN ASSISTANT

## 2019-07-11 PROCEDURE — 99999 PR PBB SHADOW E&M-EST. PATIENT-LVL IV: ICD-10-PCS | Mod: PBBFAC,,, | Performed by: PHYSICIAN ASSISTANT

## 2019-07-11 PROCEDURE — 3008F BODY MASS INDEX DOCD: CPT | Mod: CPTII,S$GLB,, | Performed by: PHYSICIAN ASSISTANT

## 2019-07-11 PROCEDURE — 73030 XR SHOULDER COMPLETE 2 OR MORE VIEWS RIGHT: ICD-10-PCS | Mod: 26,RT,, | Performed by: RADIOLOGY

## 2019-07-11 PROCEDURE — 99999 PR PBB SHADOW E&M-EST. PATIENT-LVL IV: CPT | Mod: PBBFAC,,, | Performed by: PHYSICIAN ASSISTANT

## 2019-07-11 PROCEDURE — 3075F SYST BP GE 130 - 139MM HG: CPT | Mod: CPTII,S$GLB,, | Performed by: PHYSICIAN ASSISTANT

## 2019-07-11 RX ORDER — TRIAMCINOLONE ACETONIDE 40 MG/ML
40 INJECTION, SUSPENSION INTRA-ARTICULAR; INTRAMUSCULAR
Status: COMPLETED | OUTPATIENT
Start: 2019-07-11 | End: 2019-07-11

## 2019-07-11 RX ORDER — MELOXICAM 15 MG/1
15 TABLET ORAL DAILY
Qty: 30 TABLET | Refills: 2 | Status: SHIPPED | OUTPATIENT
Start: 2019-07-11 | End: 2019-08-10

## 2019-07-11 RX ADMIN — TRIAMCINOLONE ACETONIDE 40 MG: 40 INJECTION, SUSPENSION INTRA-ARTICULAR; INTRAMUSCULAR at 05:07

## 2019-07-11 NOTE — LETTER
July 27, 2019      Taz Cartagena MD  [address]  [place] [address]           [facility]  [address]  [place] [address]  Phone: [phone]  Fax: [phone]          Patient: Vera Greer   MR Number: [id number]   YOB: 1965   Date of Visit: 7/11/2019       Dear Dr. Taz Cartagena:    Thank you for referring Vera Greer to me for evaluation. Attached you will find relevant portions of my assessment and plan of care.    If you have questions, please do not hesitate to call me. I look forward to following Vera Greer along with you.    Sincerely,    NIKI Wesley  CC:  No Recipients    If you would like to receive this communication electronically, please contact [email or web address] or [phone] to request more information on [employer] access.    For providers and/or their staff who would like to refer a patient to [facility], please contact us through our one-stop-shop provider referral line, [facility] , at [phone].    If you feel you have received this communication in error or would no longer like to receive these types of communications, please e-mail [email or web address]

## 2019-07-11 NOTE — PROGRESS NOTES
Subjective:     Patient ID: Vera Greer is a 54 y.o. female.    Chief Complaint: No chief complaint on file.    Vera Greer is a 54 y.o. female who presents for right shoulder pain . Episode began approximately a year ago after she reach behind her and had something pop in her shoulder.  This episode was followed by pain and has been intermittently bothering her since.  Patient did have an injection to her right shoulder in November 2018 which helped her for approximately 1 week, but her symptoms returned after. Pain is rated as 2/10 and is described as aching in quality. Pain is exacerbated by movements such as putting her arm behind her back. Patient has tried Aleve with good relief of symptoms. Associated symptoms include popping. Patient denies numbness and tingling.  Patient has not tried physical therapy.        Past Medical History:   Diagnosis Date    Anxiety     Chronic edema     aldactone and for hirsutism?    Depression     GERD (gastroesophageal reflux disease)     Hyperlipidemia     Hypothyroid     Irritable bowel syndrome     Sleep-related bruxism     chronic benzo x years     Past Surgical History:   Procedure Laterality Date    ADENOIDECTOMY      APPENDECTOMY      BELT ABDOMINOPLASTY      BREAST SURGERY      reduction     CHOLECYSTECTOMY      COLONOSCOPY N/A 4/27/2016    Performed by [facility] Ruth [facility] at [facility]    HYSTERECTOMY      partial     TONSILLECTOMY       Family History   Problem Relation Age of Onset    Arthritis Mother     Heart disease Father     Hypertension Father     Colon cancer Neg Hx      Social History     Socioeconomic History    Marital status:      Spouse name: ROOSEVELT    Number of children: 4    Years of education: Not on file    Highest education level: Not on file   Occupational History    Not on file   Social Needs    Financial resource strain: Not on file    Food insecurity:     Worry: Not on file     Inability: Not on file     Transportation needs:     Medical: Not on file     Non-medical: Not on file   Tobacco Use    Smoking status: Never Smoker    Smokeless tobacco: Never Used   Substance and Sexual Activity    Alcohol use: Yes     Alcohol/week: 0.0 oz     Comment: once a year     Drug use: No    Sexual activity: Yes     Partners: Male     Birth control/protection: Surgical   Lifestyle    Physical activity:     Days per week: Not on file     Minutes per session: Not on file    Stress: Not on file   Relationships    Social connections:     Talks on phone: Not on file     Gets together: Not on file     Attends Shinto service: Not on file     Active member of club or organization: Not on file     Attends meetings of clubs or organizations: Not on file     Relationship status: Not on file   Other Topics Concern    Are you pregnant or think you may be? No    Breast-feeding No   Social History Narrative         Medication List with Changes/Refills   Current Medications    BUPROPION (WELLBUTRIN) 75 MG TABLET    Take 1 tablet (75 mg total) by mouth once daily.    CALCIUM/MAGNESIUM/VIT D3 (HEALTHY BONE FORMULA ORAL)    Take by mouth.    CLOTRIMAZOLE-BETAMETHASONE 1-0.05% (LOTRISONE) CREAM        COMPOUND HORMONE REPLACEMENT    Take by mouth once daily. BIOTE VIA DR TORRES    DHA/EPA/MV/DNG JUANI/HRB42 (PMS WOMENS FORMULA ORAL)    Take by mouth. phytoestrogen    ERGOCALCIFEROL (ERGOCALCIFEROL) 50,000 UNIT CAP    Take 50,000 Units by mouth every 7 days.    FLUOCINOLONE (SYNALAR) 0.01 % EXTERNAL SOLUTION    AAA of scalp twice a day prn itching.    HYOSCYAMINE (LEVBID) 0.375 MG TB12    Take 1 tablet (0.375 mg total) by mouth every 12 (twelve) hours as needed.    MULTIVITAMIN CAPSULE    Take by mouth. 1 Capsule Oral Every day    OMEGA-3 FATTY ACIDS-VITAMIN E (OMEGA-3 FISH OIL) 1,000-5 MG-UNIT CAP    Take by mouth. 1 Capsule Oral Every day    OMEPRAZOLE (PRILOSEC) 20 MG CAPSULE    Take 20 mg by mouth once a week.    PROAIR HFA 90  MCG/ACTUATION INHALER    INHALE 2 PUFFS INTO THE LUNGS EVERY 6 (SIX) HOURS AS NEEDED FOR WHEEZING. RESCUE.    SOY ISOFLAVONES ORAL    Take 40 mg by mouth once daily.    SPIRONOLACTONE (ALDACTONE) 100 MG TABLET    Take 1 tablet (100 mg total) by mouth once daily.     Review of patient's allergies indicates:   Allergen Reactions    Augmentin [amoxicillin-pot clavulanate]      Bad yeast infxn. ;she prefers other abx     Review of Systems   Constitution: Negative for chills and fever.   HENT: Negative for ear discharge and hearing loss.    Eyes: Negative for blurred vision and visual disturbance.   Cardiovascular: Negative for chest pain and leg swelling.   Respiratory: Negative for cough and shortness of breath.    Endocrine: Negative for polyuria.   Hematologic/Lymphatic: Negative for bleeding problem.   Skin: Negative for rash.   Musculoskeletal: Positive for joint pain. Negative for back pain, joint swelling, muscle cramps and muscle weakness.   Gastrointestinal: Negative for nausea and vomiting.   Genitourinary: Negative for hematuria.   Neurological: Negative for loss of balance, numbness and paresthesias.   Psychiatric/Behavioral: Negative for altered mental status.       Objective:   There is no height or weight on file to calculate BMI.  There were no vitals filed for this visit.             General    Constitutional: She is oriented to person, place, and time. She appears well-developed and well-nourished.   HENT:   Head: Normocephalic and atraumatic.   Right Ear: External ear normal.   Left Ear: External ear normal.   Nose: Nose normal.   Eyes: EOM are normal. Pupils are equal, round, and reactive to light. Right eye exhibits no discharge. Left eye exhibits no discharge.   Neck: Normal range of motion.   Cardiovascular: Normal heart sounds.    Pulmonary/Chest: Effort normal. No respiratory distress.   Abdominal: Soft.   Neurological: She is alert and oriented to person, place, and time.   Psychiatric: She  has a normal mood and affect. Her behavior is normal. Judgment and thought content normal.         Right Shoulder Exam     Inspection/Observation   Swelling: absent  Bruising: absent  Scars: absent  Deformity: absent  Scapular Winging: absent  Scapular Dyskinesia: negative  Atrophy: absent    Tenderness   The patient is experiencing no tenderness.    Range of Motion   Active abduction:  90 normal   Passive abduction:  100 normal   Extension:  0 normal   Forward Flexion:  180 normal   Forward Elevation: 180 normal  Adduction: 40 normal  External Rotation 0 degrees:  50 normal   External Rotation 90 degrees: 90 normal  Internal rotation 0 degrees:  T8 normal   Internal rotation 90 degrees:  30 normal     Tests & Signs   Cross arm: negative  Drop arm: negative  Flannery test: positive  Impingement: negative  Lift Off Sign: negative  Active Compression Test (Avon's Sign): negative  Yergason's Test: negative  Speed's Test: negative    Other   Sensation: normal    Left Shoulder Exam     Inspection/Observation   Swelling: absent  Bruising: absent  Scars: absent  Deformity: absent  Scapular Winging: absent  Scapular Dyskinesia: negative  Atrophy: absent    Tenderness   The patient is experiencing no tenderness.     Range of Motion   Active abduction:  90 normal   Passive abduction:  100 normal   Extension:  0 normal   Forward Flexion:  180 normal   Forward Elevation: 180 normal  Adduction: 40 normal  External Rotation 0 degrees:  50 normal   External Rotation 90 degrees: 90 normal  Internal rotation 0 degrees:  T8 normal   Internal rotation 90 degrees:  30 normal     Tests & Signs   Cross arm: negative  Drop arm: negative  Flannery test: negative  Impingement: negative  Lift Off Sign: negative  Active Compression test (Avon's Sign): negative  Yergasons's Test: negative  Speed's Test: negative    Other   Sensation: normal       Muscle Strength   Right Upper Extremity   Shoulder Abduction: 5/5   Shoulder Internal  Rotation: 5/5   Shoulder External Rotation: 5/5   Supraspinatus: 5/5/5   Subscapularis: 5/5/5   Biceps: 5/5/5   Left Upper Extremity  Shoulder Abduction: 5/5   Shoulder Internal Rotation: 5/5   Shoulder External Rotation: 5/5   Supraspinatus: 5/5/5   Subscapularis: 5/5/5   Biceps: 5/5/5     Vascular Exam     Right Pulses      Radial:                    2+      Left Pulses      Radial:                    2+      Capillary Refill  Right Hand: normal capillary refill  Left Hand: normal capillary refill      I have personally reviewed the following imaging and agree with the radiologist's findings of:   XR Shoulder: No acute fracture or dislocation.  Mild AC joint arthropathy is noted.  No significant degenerative change at the glenohumeral joint.    Assessment:     Encounter Diagnoses   Name Primary?    Rotator cuff tendonitis, right Yes    Acute pain of right shoulder         Plan:     Rice therapy  Mobic 15 mg  PT  Steroid injection to the right shoulder today  Follow-up in 8 weeks    Patient verbalizes understanding and agrees with the above plan.    Luis Melo PA-C  Orthopedic Surgery/Sports Medicine    Right Shoulder Injection Report:  After verbal consent was obtained for right shoulder injection, patient ID, site, and side were verified.  The  right  Shoulder was sterilly prepped in the standard fashion.  A 22-gauge needle was introduced into right subacromial space from the posterior portal approach without complication. The right shoulder was then injected with 2 cc xylocaine, 2 cc bupivacaine, and 1 cc 40 mg Kenalog.  A sterile bandaid was applied.  The patient was informed to apply an ice pack approximately 10min once arriving home and not to do anything strenuous for 24hours. She was instructed to call if there were any problems. The patient was discharged in stable condition.        Disclaimer: This note was prepared using a voice recognition system and is likely to have sound alike errors within  the text.

## 2019-07-21 ENCOUNTER — PATIENT MESSAGE (OUTPATIENT)
Dept: INTERNAL MEDICINE | Facility: CLINIC | Age: 54
End: 2019-07-21

## 2019-07-22 ENCOUNTER — PATIENT MESSAGE (OUTPATIENT)
Dept: INTERNAL MEDICINE | Facility: CLINIC | Age: 54
End: 2019-07-22

## 2019-07-23 ENCOUNTER — CLINICAL SUPPORT (OUTPATIENT)
Dept: REHABILITATION | Facility: HOSPITAL | Age: 54
End: 2019-07-23
Attending: PHYSICIAN ASSISTANT
Payer: COMMERCIAL

## 2019-07-23 ENCOUNTER — LAB VISIT (OUTPATIENT)
Dept: LAB | Facility: HOSPITAL | Age: 54
End: 2019-07-23
Attending: FAMILY MEDICINE
Payer: COMMERCIAL

## 2019-07-23 DIAGNOSIS — M25.511 CHRONIC RIGHT SHOULDER PAIN: Primary | ICD-10-CM

## 2019-07-23 DIAGNOSIS — M25.611 SHOULDER STIFFNESS, RIGHT: ICD-10-CM

## 2019-07-23 DIAGNOSIS — D72.829 LEUKOCYTOSIS, UNSPECIFIED TYPE: ICD-10-CM

## 2019-07-23 DIAGNOSIS — G89.29 CHRONIC RIGHT SHOULDER PAIN: Primary | ICD-10-CM

## 2019-07-23 DIAGNOSIS — R53.1 WEAKNESS: ICD-10-CM

## 2019-07-23 LAB
BASOPHILS # BLD AUTO: 0.03 K/UL (ref 0–0.2)
BASOPHILS NFR BLD: 0.3 % (ref 0–1.9)
DIFFERENTIAL METHOD: ABNORMAL
EOSINOPHIL # BLD AUTO: 0.1 K/UL (ref 0–0.5)
EOSINOPHIL NFR BLD: 1.1 % (ref 0–8)
ERYTHROCYTE [DISTWIDTH] IN BLOOD BY AUTOMATED COUNT: 12.6 % (ref 11.5–14.5)
HCT VFR BLD AUTO: 45.5 % (ref 37–48.5)
HGB BLD-MCNC: 14.4 G/DL (ref 12–16)
IMM GRANULOCYTES # BLD AUTO: 0.04 K/UL (ref 0–0.04)
IMM GRANULOCYTES NFR BLD AUTO: 0.4 % (ref 0–0.5)
LYMPHOCYTES # BLD AUTO: 3.7 K/UL (ref 1–4.8)
LYMPHOCYTES NFR BLD: 34.6 % (ref 18–48)
MCH RBC QN AUTO: 29.6 PG (ref 27–31)
MCHC RBC AUTO-ENTMCNC: 31.6 G/DL (ref 32–36)
MCV RBC AUTO: 94 FL (ref 82–98)
MONOCYTES # BLD AUTO: 0.7 K/UL (ref 0.3–1)
MONOCYTES NFR BLD: 6.7 % (ref 4–15)
NEUTROPHILS # BLD AUTO: 6.1 K/UL (ref 1.8–7.7)
NEUTROPHILS NFR BLD: 56.9 % (ref 38–73)
NRBC BLD-RTO: 0 /100 WBC
PLATELET # BLD AUTO: 332 K/UL (ref 150–350)
PMV BLD AUTO: 10.8 FL (ref 9.2–12.9)
RBC # BLD AUTO: 4.86 M/UL (ref 4–5.4)
WBC # BLD AUTO: 10.65 K/UL (ref 3.9–12.7)

## 2019-07-23 PROCEDURE — 36415 COLL VENOUS BLD VENIPUNCTURE: CPT

## 2019-07-23 PROCEDURE — 97140 MANUAL THERAPY 1/> REGIONS: CPT

## 2019-07-23 PROCEDURE — 97161 PT EVAL LOW COMPLEX 20 MIN: CPT

## 2019-07-23 PROCEDURE — 85025 COMPLETE CBC W/AUTO DIFF WBC: CPT

## 2019-07-23 NOTE — PLAN OF CARE
[facility]  Physical Therapy Initial Evaluation    Name: Vera Greer  Clinic Number: [id number]    Therapy Diagnosis:   Encounter Diagnoses   Name Primary?    Chronic right shoulder pain Yes    Shoulder stiffness, right     Weakness      Physician: Luis Melo, *    Physician Orders: PT Eval and Treat   Medical Diagnosis from Referral: M75.81 (ICD-10-CM) - Rotator cuff tendonitis, right  Evaluation Date: 7/23/2019  Authorization Period Expiration: 12/31/19  Plan of Care Expiration: 8/23/19  Visit # / Visits authorized:1/12    Time In:10:00a  Time Out: 11:00a  Total Billable Time: 15 minutes    Precautions: Standard    Subjective   Date of onset: sept 2019  History of current condition - Vera reports: first feeling R shoulder pain when reaching into back seat of car and lifting on a heavy bag using R UE. She felt a sharp pain in shoulder then. She did have an injection in R shoulder, which helped x 5days, but since then pain has returned and been worsening. Pt reports R arm feels heavy and painful ant,lat,post shoulder areas.      Pain:  Current 1/10, worst 6/10, best 1/10   Location: right shoulder   Description: Aching  Aggravating Factors: reaching overhead and behind back  Easing Factors: aleve, ice more than heat    Prior Therapy: PT for frozen R shoulder 10 yr ago  Social History:  lives with their family  Occupation: /deskwork  Prior Level of Function: indep with all use of R UE  Current Level of Function: painful and limited use of R UE    Imaging, x-ray:neg    Medical History:   Past Medical History:   Diagnosis Date    Anxiety     Chronic edema     aldactone and for hirsutism?    Depression     GERD (gastroesophageal reflux disease)     Hyperlipidemia     Hypothyroid     Irritable bowel syndrome     Sleep-related bruxism     chronic benzo x years       Surgical History:   Vera Greer  has a past surgical history that includes Tonsillectomy;  Adenoidectomy; Appendectomy; Cholecystectomy; Breast surgery; Hysterectomy; Belt abdominoplasty; and Colonoscopy (N/A, 4/27/2016).    Medications:   Vera has a current medication list which includes the following prescription(s): bupropion, calcium/magnesium/vit d3, clotrimazole-betamethasone 1-0.05%, COMPOUND HORMONE REPLACEMENT, dha/epa/mv,iron,min/d.quai/hrb, ergocalciferol, fluocinolone, hyoscyamine, meloxicam, multivitamin, omega-3 fatty acids-vitamin e, omeprazole, proair hfa, soy isoflavone, and spironolactone, and the following Facility-Administered Medications: triamcinolone acetonide.    Allergies:   Review of patient's allergies indicates:   Allergen Reactions    Augmentin [amoxicillin-pot clavulanate]      Bad yeast infxn. ;she prefers other abx        Pts goals: R shoulder pain free and avoid surgery    Objective       Posture: Pt noted to present with rounded shoulder posture more on R than L.    Scapular AROM standing:     Shoulder ROM:   Active/Passive Joint Range Right Left   Flexion 108    ABDuction 90    External Rotation 39    Internal Rotation PSIS    Extension       Strength:  Muscle (Myotome) Right Left   Shoulder Flex 4+ painful    Shoulder Abduction 4+ painful    Shoulder IR 5    Shoulder ER 4+    Elbow flex 4+      Sensation: Intact    Special Test:  Flannery Neg  Neers  pos     Empty Can neg  Drop Arm neg     Speeds neg  Apprehension Neg       Joint Mobility:hypomobility noted R glenohumeral joint post, ant, infer.      Function: Patient reports 47% disability based on score of the Upper Extremity Functional Scale.  Quick DASH Shoulder Questionnaire           Score 1-5  1. Opening a tight jar       2  2. Do heavy household chores     4  3. Carry a shopping bag or briefcase     1  4. Wash your back      5  5. Use a knife to cut food     1  6. Recreactional activities requiring force   5  7. Social Limitation      3  8. Work/ADL limitation      3  9. Arm, Shoulder or hand  Pain    3  10. Tingling       2  11. Sleeping Limitation      3      Tenderness to palpation:  Patient tender to palpate ant,lat,post R shoulder, subacromial area, biceps tendon.    TREATMENT     Vera received therapeutic exercises to develop strength, ROM, flexibility and posture for 0 minutes including:      Vera received the following manual therapy techniques: Joint mobilizations and Soft tissue Mobilization were applied to the: R shoulder for 15 minutes, including:  STM/stretch R pect minor, grade II mobs R GH inf,post,ant; passive R shoulder IR,ER, flex stretching    Vera received the following direct contact modalities after being cleared for contraindications: Ultrasound:  Vera received ultrasound to manage pain and inflammation at  % duty cycle applied to the  at an intensity of  number entry box W/cm2  for a duration of 0 minutes. Patient tolerated treatment well without adverse effects. Therapist was in attendance throughout intervention.    Vera received the following supervised modalities after being cleared for contradictions: IFC Electrical Stimulation:  Vera received IFC Electrical Stimulation for pain control applied to the . Pt received stimulation at  % scan at a frequency of  for  minutes. Vera tolderated treatment well without any adverse effects.      Vera received hot pack for 10 minutes to R shoulder.    Vera received cold pack for  minutes to .    Home Exercises and Patient Education Provided    Education provided:   -Education on condition, HEP, and postural awareness/correction especially when working at computer.    Written Home Exercises Provided: yes.  Exercises were reviewed and Vera was able to demonstrate them prior to the end of the session.  Vera demonstrated good  understanding of the education provided.     See EMR under Patient Instructions for exercises provided 7/23/2019.    Assessment   Vera is a 54 y.o. female referred to outpatient Physical Therapy  with a medical diagnosis of R shoulder tendonitis. Pt presents with chronic R shoulder pain, significant tightness/stiffness and weakness. She displays rounded shoulder posture. She tolerated manual treatment to R shoulder with mild to mod discomfort, but following this she displayed reduced rounded shoulder posture and improved ER, IR, flex, abd AROM with less pain.     Pt prognosis is Excellent.   Pt will benefit from skilled outpatient Physical Therapy to address the deficits stated above and in the chart below, provide pt/family education, and to maximize pt's level of independence.     Plan of care discussed with patient: Yes  Pt's spiritual, cultural and educational needs considered and patient is agreeable to the plan of care and goals as stated below:     Anticipated Barriers for therapy: none    Medical Necessity is demonstrated by the following  History  Co-morbidities and personal factors that may impact the plan of care Co-morbidities:   none    Personal Factors:   no deficits     low   Examination  Body Structures and Functions, activity limitations and participation restrictions that may impact the plan of care Body Regions:   upper extremities    Body Systems:    none    Participation Restrictions:   none    Activity limitations:   Learning and applying knowledge  no deficits    General Tasks and Commands  no deficits    Communication  no deficits    Mobility  no deficits    Self care  no deficits    Domestic Life  no deficits    Interactions/Relationships  no deficits    Life Areas  no deficits    Community and Social Life  no deficits         low   Clinical Presentation stable and uncomplicated low   Decision Making/ Complexity Score: low     Goals:  Short Term Goals: In 3 weeks   1.I with HEP  2.decrease R shoulder pain to 3/10  3. Increase R shoulder AROM ER to 55 deg, flex to 115 deg abd to 110 deg, IR to L2  4. Increase PROM R shoulder (90 deg abd) IR to 35 deg, ER to 60 deg    Long Term Goals:  In 6 weeks  1. Decrease R shoulder pain to 1/10  2. Increase R shoulder AROM to grossly WFL-WNL  3. Increase R shoulder PROM IR/ER in 90 deg abd to WFL-WNL  4 R shoulder strength grossly 5/5      Plan   Plan of care Certification: 7/23/2019 to 8/23/19.    Outpatient Physical Therapy 3 times weekly for 6 weeks to include the following interventions: Electrical Stimulation IFC, strengthening, stretching, joint mobilizations, US, heat, ice.     Jay Ortiz, PT    Thank you for this referral.    These services are reasonable and necessary for the conditions set forth above while under my care.

## 2019-07-31 ENCOUNTER — CLINICAL SUPPORT (OUTPATIENT)
Dept: REHABILITATION | Facility: HOSPITAL | Age: 54
End: 2019-07-31
Attending: PHYSICIAN ASSISTANT
Payer: COMMERCIAL

## 2019-07-31 DIAGNOSIS — M25.611 SHOULDER STIFFNESS, RIGHT: ICD-10-CM

## 2019-07-31 DIAGNOSIS — G89.29 CHRONIC RIGHT SHOULDER PAIN: Primary | ICD-10-CM

## 2019-07-31 DIAGNOSIS — R53.1 WEAKNESS: ICD-10-CM

## 2019-07-31 DIAGNOSIS — M25.511 CHRONIC RIGHT SHOULDER PAIN: Primary | ICD-10-CM

## 2019-07-31 PROCEDURE — 97110 THERAPEUTIC EXERCISES: CPT

## 2019-07-31 PROCEDURE — 97035 APP MDLTY 1+ULTRASOUND EA 15: CPT

## 2019-07-31 PROCEDURE — 97140 MANUAL THERAPY 1/> REGIONS: CPT

## 2019-07-31 NOTE — PROGRESS NOTES
Physical Therapy Daily Treatment Note     Name: Vera ANTUNEZ [facility] Number: [id number]    Therapy Diagnosis:   Encounter Diagnoses   Name Primary?    Chronic right shoulder pain Yes    Shoulder stiffness, right     Weakness      Physician: Luis Melo, *    Visit Date: 7/31/2019  Physician Orders: PT Eval and Treat   Medical Diagnosis from Referral: M75.81 (ICD-10-CM) - Rotator cuff tendonitis, right  Evaluation Date: 7/23/2019  Authorization Period Expiration: 12/31/19  Plan of Care Expiration: 8/23/19  Visit # / Visits authorized:2/12    Time In: 2:30p  Time Out: 3:40p  Total Billable Time: 46 minutes    Precautions: Standard    Subjective     Pt reports: having 2 days of increased R shoulder pain after initial visit, but since then she has had almost not shoulder pain.  She was compliant with home exercise program.  Response to previous treatment: temporary increase in pain  Functional change: decreased pain after soreness subsided    Pain: 1/10  Location: right shoulder      Objective     Vera received therapeutic exercises to develop strength, ROM, flexibility and posture for 36 minutes including:  UBE backwards 4min  Over door pulley flex,scap 2min ea  R shoulder ER AAROM/stretch using stick 5/15sec  B seated row 3/10/20  R shoulder ER 3/10/yellow  Supine R shoulder punch 3/10/1  R shoulder abd in sidelying 3/10/1  R sleeper stretch 5/15sec    Vera received the following manual therapy techniques: Joint mobilizations and Soft tissue Mobilization were applied to the: R shoulder for 15 minutes, including:  STM/stretch R pect minor, grade II mobs R GH inf,post,ant; passive R shoulder IR,ER, flex stretching    Vera received the following direct contact modalities after being cleared for contraindications: Ultrasound:  Vera received ultrasound to manage pain and inflammation at 100 % duty cycle applied to the R shoulder at an intensity of  1.5 W/cm2  for a duration of 8 minutes. Patient  tolerated treatment well without adverse effects. Therapist was in attendance throughout intervention.    Vera received hot pack for 0 minutes to R shoulder.    Vera received cold pack for 5 minutes to R shoulder.      Home Exercises Provided and Patient Education Provided     Education provided:   -     Written Home Exercises Provided: Patient instructed to cont prior HEP.  Exercises were reviewed and Vera was able to demonstrate them prior to the end of the session.  Vera demonstrated good  understanding of the education provided.     See EMR under Patient Instructions for exercises provided prior visit.    Assessment     Pt presented to PT with significant reduction in R shoulder pain. She was still limited with AROM R shoulder, but improved from initial visit. She tolerated therex with mod c/o R shoulder pain and manual treatment with occasional more intense pain, but manual treatment was very effective in improving ER, IR ROM.     Vera is progressing well towards her goals.   Pt prognosis is Excellent.     Pt will continue to benefit from skilled outpatient physical therapy to address the deficits listed in the problem list box on initial evaluation, provide pt/family education and to maximize pt's level of independence in the home and community environment.     Pt's spiritual, cultural and educational needs considered and pt agreeable to plan of care and goals.     Anticipated barriers to physical therapy: none    Goals:  Short Term Goals: In 3 weeks   1.I with HEP  2.decrease R shoulder pain to 3/10  3. Increase R shoulder AROM ER to 55 deg, flex to 115 deg abd to 110 deg, IR to L2  4. Increase PROM R shoulder (90 deg abd) IR to 35 deg, ER to 60 deg     Long Term Goals: In 6 weeks  1. Decrease R shoulder pain to 1/10  2. Increase R shoulder AROM to grossly WFL-WNL  3. Increase R shoulder PROM IR/ER in 90 deg abd to WFL-WNL  4 R shoulder strength grossly 5/5    Plan   Outpatient Physical Therapy 3  times weekly for 6 weeks to include the following interventions: Electrical Stimulation IFC, strengthening, stretching, joint mobilizations, US, heat, ice.     Jay Ortiz, PT

## 2019-08-02 ENCOUNTER — CLINICAL SUPPORT (OUTPATIENT)
Dept: REHABILITATION | Facility: HOSPITAL | Age: 54
End: 2019-08-02
Attending: PHYSICIAN ASSISTANT
Payer: COMMERCIAL

## 2019-08-02 DIAGNOSIS — R53.1 WEAKNESS: ICD-10-CM

## 2019-08-02 DIAGNOSIS — M25.511 CHRONIC RIGHT SHOULDER PAIN: Primary | ICD-10-CM

## 2019-08-02 DIAGNOSIS — G89.29 CHRONIC RIGHT SHOULDER PAIN: Primary | ICD-10-CM

## 2019-08-02 DIAGNOSIS — M25.611 SHOULDER STIFFNESS, RIGHT: ICD-10-CM

## 2019-08-02 PROCEDURE — 97110 THERAPEUTIC EXERCISES: CPT

## 2019-08-02 PROCEDURE — 97035 APP MDLTY 1+ULTRASOUND EA 15: CPT

## 2019-08-02 PROCEDURE — 97140 MANUAL THERAPY 1/> REGIONS: CPT

## 2019-08-02 NOTE — PROGRESS NOTES
Physical Therapy Daily Treatment Note     Name: Vera ANTUNEZ [facility] Number: [id number]    Therapy Diagnosis:   Encounter Diagnoses   Name Primary?    Chronic right shoulder pain Yes    Shoulder stiffness, right     Weakness      Physician: Luis Melo, *    Visit Date: 8/2/2019  Physician Orders: PT Eval and Treat   Medical Diagnosis from Referral: M75.81 (ICD-10-CM) - Rotator cuff tendonitis, right  Evaluation Date: 7/23/2019  Authorization Period Expiration: 12/31/19  Plan of Care Expiration: 8/23/19  Visit # / Visits authorized:3/12    Time In: 1:30p  Time Out: 2:40p  Total Billable Time: 46 minutes    Precautions: Standard    Subjective     Pt reports: some increase in R shoulder soreness following last treatment, but not nearly has painful as after prior visit. She notes improved R shoulder mobility  She was compliant with home exercise program.  Response to previous treatment: mild soreness  Functional change: increased R shoulder mobility    Pain: 1/10  Location: right shoulder      Objective     Vera received therapeutic exercises to develop strength, ROM, flexibility and posture for 32 minutes including:  UBE backwards 4min  Over door pulley flex,scap 2min ea  R shoulder ER AAROM/stretch using stick 5/15sec  B seated row 3/10/20  R shoulder ER 3/10/yellow  Supine R shoulder punch 3/10/1  R shoulder abd in sidelying 3/10/1  R sleeper stretch 5/15sec    Vera received the following manual therapy techniques: Joint mobilizations and Soft tissue Mobilization were applied to the: R shoulder for 12 minutes, including:  STM/stretch R pect minor, grade II mobs R GH inf,post,ant; passive R shoulder IR,ER, flex stretching    Vera received the following direct contact modalities after being cleared for contraindications: Ultrasound:  Vera received ultrasound to manage pain and inflammation at 100 % duty cycle applied to the R shoulder at an intensity of  1.5 W/cm2  for a duration of 8 minutes.  Patient tolerated treatment well without adverse effects. Therapist was in attendance throughout intervention.    Vera received hot pack for 0 minutes to R shoulder.    Vera received cold pack for 5 minutes to R shoulder.      Home Exercises Provided and Patient Education Provided     Education provided:   -     Written Home Exercises Provided: Patient instructed to cont prior HEP.  Exercises were reviewed and Vera was able to demonstrate them prior to the end of the session.  Vera demonstrated good  understanding of the education provided.     See EMR under Patient Instructions for exercises provided prior visit.    Assessment   Pt tolerated manual treatment with mod c/o pain with inf GH mobs and stretching into flex, abd, ER. She noted much less tenderness with STM to pec minor. Decreased pec minor tightness noted. AROM R shoulder flexion improved.      Vera is progressing well towards her goals.   Pt prognosis is Excellent.     Pt will continue to benefit from skilled outpatient physical therapy to address the deficits listed in the problem list box on initial evaluation, provide pt/family education and to maximize pt's level of independence in the home and community environment.     Pt's spiritual, cultural and educational needs considered and pt agreeable to plan of care and goals.     Anticipated barriers to physical therapy: none    Goals:  Short Term Goals: In 3 weeks   1.I with HEP  2.decrease R shoulder pain to 3/10  3. Increase R shoulder AROM ER to 55 deg, flex to 115 deg abd to 110 deg, IR to L2  4. Increase PROM R shoulder (90 deg abd) IR to 35 deg, ER to 60 deg     Long Term Goals: In 6 weeks  1. Decrease R shoulder pain to 1/10  2. Increase R shoulder AROM to grossly WFL-WNL  3. Increase R shoulder PROM IR/ER in 90 deg abd to WFL-WNL  4 R shoulder strength grossly 5/5    Plan   Outpatient Physical Therapy 3 times weekly for 6 weeks to include the following interventions: Electrical  Stimulation IFC, strengthening, stretching, joint mobilizations, US, heat, ice.     Jay Ortiz, PT

## 2019-08-06 ENCOUNTER — CLINICAL SUPPORT (OUTPATIENT)
Dept: REHABILITATION | Facility: HOSPITAL | Age: 54
End: 2019-08-06
Attending: PHYSICIAN ASSISTANT
Payer: COMMERCIAL

## 2019-08-06 DIAGNOSIS — M25.511 CHRONIC RIGHT SHOULDER PAIN: Primary | ICD-10-CM

## 2019-08-06 DIAGNOSIS — R53.1 WEAKNESS: ICD-10-CM

## 2019-08-06 DIAGNOSIS — M25.611 SHOULDER STIFFNESS, RIGHT: ICD-10-CM

## 2019-08-06 DIAGNOSIS — G89.29 CHRONIC RIGHT SHOULDER PAIN: Primary | ICD-10-CM

## 2019-08-06 PROCEDURE — 97140 MANUAL THERAPY 1/> REGIONS: CPT

## 2019-08-06 PROCEDURE — 97110 THERAPEUTIC EXERCISES: CPT

## 2019-08-06 NOTE — PROGRESS NOTES
Physical Therapy Daily Treatment Note     Name: Vera ANTUNEZ [facility] Number: [id number]    Therapy Diagnosis:   Encounter Diagnoses   Name Primary?    Chronic right shoulder pain Yes    Shoulder stiffness, right     Weakness      Physician: Luis Melo, *    Visit Date: 8/6/2019  Physician Orders: PT Eval and Treat   Medical Diagnosis from Referral: M75.81 (ICD-10-CM) - Rotator cuff tendonitis, right  Evaluation Date: 7/23/2019  Authorization Period Expiration: 12/31/19  Plan of Care Expiration: 8/23/19  Visit # / Visits authorized:4/12    Time In: 1:00p  Time Out: 2:05p  Total Billable Time: 46 minutes    Precautions: Standard    Subjective     Pt reports: having barely any R shoulder pain lately, but this morning woke up with ant R shoulder and bicep area pain to about 1-2/10. It is about 1/10  Now.  She was compliant with home exercise program.  Response to previous treatment: mild soreness  Functional change: increased R shoulder mobility    Pain:0- 1/10  Location: right shoulder      Objective     Vera received therapeutic exercises to develop strength, ROM, flexibility and posture for 32 minutes including:  UBE backwards 4min  Over door pulley flex,scap 2min ea  R shoulder ER AAROM/stretch using stick 5/15sec  B seated row 3/10/20  R shoulder ER 3/10/red  Supine R shoulder punch/flex 3/10/2  R shoulder abd in sidelying 3/10/2      Vera received the following manual therapy techniques: Joint mobilizations and Soft tissue Mobilization were applied to the: R shoulder for 14 minutes, including:  STM/stretch R pect minor, grade II mobs R GH inf,post,ant; passive R shoulder IR,ER, flex stretching, STM with device to R shoulder.    Vera received the following direct contact modalities after being cleared for contraindications: Ultrasound:  Vera received ultrasound to manage pain and inflammation at 100 % duty cycle applied to the R shoulder at an intensity of  1.5 W/cm2  for a duration of 0  minutes. Patient tolerated treatment well without adverse effects. Therapist was in attendance throughout intervention.    Vera received hot pack for 0 minutes to R shoulder.    Vera received cold pack for 0 minutes to R shoulder.      Home Exercises Provided and Patient Education Provided     Education provided:   -     Written Home Exercises Provided: Patient instructed to cont prior HEP.  Exercises were reviewed and Vera was able to demonstrate them prior to the end of the session.  Vera demonstrated good  understanding of the education provided.     See EMR under Patient Instructions for exercises provided prior visit.    Assessment   Initiated treatment with STM with device to R shoulder, which pt tolerated with moderate pain, but noted reduced pain and improved mobility afterwards. Pt continues to improve steadily with R shoulder ROM.      Vera is progressing well towards her goals.   Pt prognosis is Excellent.     Pt will continue to benefit from skilled outpatient physical therapy to address the deficits listed in the problem list box on initial evaluation, provide pt/family education and to maximize pt's level of independence in the home and community environment.     Pt's spiritual, cultural and educational needs considered and pt agreeable to plan of care and goals.     Anticipated barriers to physical therapy: none    Goals:  Short Term Goals: In 3 weeks   1.I with HEP  2.decrease R shoulder pain to 3/10  3. Increase R shoulder AROM ER to 55 deg, flex to 115 deg abd to 110 deg, IR to L2  4. Increase PROM R shoulder (90 deg abd) IR to 35 deg, ER to 60 deg     Long Term Goals: In 6 weeks  1. Decrease R shoulder pain to 1/10  2. Increase R shoulder AROM to grossly WFL-WNL  3. Increase R shoulder PROM IR/ER in 90 deg abd to WFL-WNL  4 R shoulder strength grossly 5/5    Plan   Outpatient Physical Therapy 3 times weekly for 6 weeks to include the following interventions: Electrical Stimulation IFC,  strengthening, stretching, joint mobilizations, US, heat, ice.     Jay Ortiz, PT

## 2019-08-09 ENCOUNTER — CLINICAL SUPPORT (OUTPATIENT)
Dept: REHABILITATION | Facility: HOSPITAL | Age: 54
End: 2019-08-09
Attending: PHYSICIAN ASSISTANT
Payer: COMMERCIAL

## 2019-08-09 DIAGNOSIS — M25.511 CHRONIC RIGHT SHOULDER PAIN: Primary | ICD-10-CM

## 2019-08-09 DIAGNOSIS — R53.1 WEAKNESS: ICD-10-CM

## 2019-08-09 DIAGNOSIS — G89.29 CHRONIC RIGHT SHOULDER PAIN: Primary | ICD-10-CM

## 2019-08-09 DIAGNOSIS — M25.611 SHOULDER STIFFNESS, RIGHT: ICD-10-CM

## 2019-08-09 PROCEDURE — 97140 MANUAL THERAPY 1/> REGIONS: CPT

## 2019-08-09 PROCEDURE — 97110 THERAPEUTIC EXERCISES: CPT

## 2019-08-09 NOTE — PROGRESS NOTES
Physical Therapy Daily Treatment Note     Name: Vera ANTUNEZ [facility] Number: [id number]    Therapy Diagnosis:   Encounter Diagnoses   Name Primary?    Chronic right shoulder pain Yes    Shoulder stiffness, right     Weakness      Physician: Luis Melo, *    Visit Date: 8/9/2019  Physician Orders: PT Eval and Treat   Medical Diagnosis from Referral: M75.81 (ICD-10-CM) - Rotator cuff tendonitis, right  Evaluation Date: 7/23/2019  Authorization Period Expiration: 12/31/19  Plan of Care Expiration: 8/23/19  Visit # / Visits authorized:5/12    Time In: 1:05p  Time Out: 2:10p  Total Billable Time: 44 minutes    Precautions: Standard    Subjective     Pt reports: her R shoulder is hurting and more stiff today. She noted working at computer about 8 hours both of last 2 days. She thinks this may have contributed to increased pain/stiffness.  She was compliant with home exercise program.  Response to previous treatment: mild soreness  Functional change: increased R shoulder mobility    Pain:0- 1/10  Location: right shoulder      Objective     Vera received therapeutic exercises to develop strength, ROM, flexibility and posture for 32 minutes including:  UBE backwards 4min  Over door pulley flex,scap 2min ea  R shoulder ER stretch at door way in 0 and 90 deg abd  B seated row 3/10/30  R shoulder ER 3/10/red  Supine R shoulder flexion 3/10/2  R shoulder abd in sidelying 3/10/2  R lat pulldown 4/10/30lb  R sleeper stretch 5/20sec      Vera received the following manual therapy techniques: Joint mobilizations and Soft tissue Mobilization were applied to the: R shoulder for 12 minutes, including:  STM/stretch R pect minor, grade II mobs R GH inf,post,ant; passive R shoulder IR,ER, flex stretching,    Vera received the following direct contact modalities after being cleared for contraindications: Ultrasound:  Vera received ultrasound to manage pain and inflammation at 100 % duty cycle applied to the R  shoulder at an intensity of  1.5 W/cm2  for a duration of 0 minutes. Patient tolerated treatment well without adverse effects. Therapist was in attendance throughout intervention.    Vera received hot pack for 5 minutes to R shoulder.    Vera received cold pack for 0 minutes to R shoulder.      Home Exercises Provided and Patient Education Provided     Education provided:   -     Written Home Exercises Provided: Patient instructed to cont prior HEP.  Exercises were reviewed and Vera was able to demonstrate them prior to the end of the session.  Vera demonstrated good  understanding of the education provided.     See EMR under Patient Instructions for exercises provided prior visit.    Assessment   Pt presented with increased c/o soreness/pain in R shoulder today. Increased tightness and tenderness noted R pec minor and subscap. She had more pain with PROM R shoulder today.     Manual treatment did help some, but more tight/tender than usual with manual treatment. Added doorway shoulder ER, flex, scapt stretching to HEP.  Vera is progressing well towards her goals.   Pt prognosis is Excellent.     Pt will continue to benefit from skilled outpatient physical therapy to address the deficits listed in the problem list box on initial evaluation, provide pt/family education and to maximize pt's level of independence in the home and community environment.     Pt's spiritual, cultural and educational needs considered and pt agreeable to plan of care and goals.     Anticipated barriers to physical therapy: none    Goals:  Short Term Goals: In 3 weeks   1.I with HEP  2.decrease R shoulder pain to 3/10  3. Increase R shoulder AROM ER to 55 deg, flex to 115 deg abd to 110 deg, IR to L2  4. Increase PROM R shoulder (90 deg abd) IR to 35 deg, ER to 60 deg     Long Term Goals: In 6 weeks  1. Decrease R shoulder pain to 1/10  2. Increase R shoulder AROM to grossly WFL-WNL  3. Increase R shoulder PROM IR/ER in 90 deg abd  to WFL-WNL  4 R shoulder strength grossly 5/5    Plan   Outpatient Physical Therapy 3 times weekly for 6 weeks to include the following interventions: Electrical Stimulation IFC, strengthening, stretching, joint mobilizations, US, heat, ice.     Jay Ortiz, PT

## 2019-08-13 NOTE — PROGRESS NOTES
Subjective:     Patient ID: Vera Greer is a 54 y.o. female.    Chief Complaint: No chief complaint on file.    Vera Greer is a 54 y.o. female who presents for right shoulder pain . Episode began approximately a year ago after she reach behind her and had something pop in her shoulder.  This episode was followed by pain and has been intermittently bothering her since.  Patient did have an injection to her right shoulder in November 2018 which helped her for approximately 1 week, but her symptoms returned after. Pain is rated as 2/10 and is described as aching in quality. Pain is exacerbated by movements such as putting her arm behind her back. Patient has tried Aleve with good relief of symptoms. Associated symptoms include popping. Patient denies numbness and tingling.  Patient has not tried physical therapy.    Patient presents for follow up for right shoulder pain. Patient had right steroid injection at last visit which did not help her symptoms. Patient has also been continuing PT since last visit. Patient has not been taking Mobic as prescribed. Overall her symptoms are the same today.       Past Medical History:   Diagnosis Date    Anxiety     Chronic edema     aldactone and for hirsutism?    Depression     GERD (gastroesophageal reflux disease)     Hyperlipidemia     Hypothyroid     Irritable bowel syndrome     Sleep-related bruxism     chronic benzo x years     Past Surgical History:   Procedure Laterality Date    ADENOIDECTOMY      APPENDECTOMY      BELT ABDOMINOPLASTY      BREAST SURGERY      reduction     CHOLECYSTECTOMY      COLONOSCOPY N/A 4/27/2016    Performed by Lisbeth Ruth [facility] at [facility]    HYSTERECTOMY      partial     TONSILLECTOMY       Family History   Problem Relation Age of Onset    Arthritis Mother     Heart disease Father     Hypertension Father     Colon cancer Neg Hx      Social History     Socioeconomic History    Marital status:       Spouse name: ROOSEVELT    Number of children: 4    Years of education: Not on file    Highest education level: Not on file   Occupational History    Not on file   Social Needs    Financial resource strain: Not on file    Food insecurity:     Worry: Not on file     Inability: Not on file    Transportation needs:     Medical: Not on file     Non-medical: Not on file   Tobacco Use    Smoking status: Never Smoker    Smokeless tobacco: Never Used   Substance and Sexual Activity    Alcohol use: Yes     Alcohol/week: 0.0 oz     Comment: once a year     Drug use: No    Sexual activity: Yes     Partners: Male     Birth control/protection: Surgical   Lifestyle    Physical activity:     Days per week: Not on file     Minutes per session: Not on file    Stress: Not on file   Relationships    Social connections:     Talks on phone: Not on file     Gets together: Not on file     Attends Cheondoism service: Not on file     Active member of club or organization: Not on file     Attends meetings of clubs or organizations: Not on file     Relationship status: Not on file   Other Topics Concern    Are you pregnant or think you may be? No    Breast-feeding No   Social History Narrative         Medication List with Changes/Refills   Current Medications    BUPROPION (WELLBUTRIN) 75 MG TABLET    Take 1 tablet (75 mg total) by mouth once daily.    CALCIUM/MAGNESIUM/VIT D3 (HEALTHY BONE FORMULA ORAL)    Take by mouth.    CLOTRIMAZOLE-BETAMETHASONE 1-0.05% (LOTRISONE) CREAM        COMPOUND HORMONE REPLACEMENT    Take by mouth once daily. BIOTE VIA DR TORRES    DHA/EPA/MV/DNG JUANI/HRB42 (PMS WOMENS FORMULA ORAL)    Take by mouth. phytoestrogen    ERGOCALCIFEROL (ERGOCALCIFEROL) 50,000 UNIT CAP    Take 50,000 Units by mouth every 7 days.    FLUOCINOLONE (SYNALAR) 0.01 % EXTERNAL SOLUTION    AAA of scalp twice a day prn itching.    HYOSCYAMINE (LEVBID) 0.375 MG TB12    Take 1 tablet (0.375 mg total) by mouth every 12 (twelve)  hours as needed.    MULTIVITAMIN CAPSULE    Take by mouth. 1 Capsule Oral Every day    OMEGA-3 FATTY ACIDS-VITAMIN E (OMEGA-3 FISH OIL) 1,000-5 MG-UNIT CAP    Take by mouth. 1 Capsule Oral Every day    OMEPRAZOLE (PRILOSEC) 20 MG CAPSULE    Take 20 mg by mouth once a week.    PROAIR HFA 90 MCG/ACTUATION INHALER    INHALE 2 PUFFS INTO THE LUNGS EVERY 6 (SIX) HOURS AS NEEDED FOR WHEEZING. RESCUE.    SOY ISOFLAVONES ORAL    Take 40 mg by mouth once daily.    SPIRONOLACTONE (ALDACTONE) 100 MG TABLET    Take 1 tablet (100 mg total) by mouth once daily.     Review of patient's allergies indicates:   Allergen Reactions    Augmentin [amoxicillin-pot clavulanate]      Bad yeast infxn. ;she prefers other abx     Review of Systems   Constitution: Negative for chills and fever.   HENT: Negative for ear discharge and hearing loss.    Eyes: Negative for blurred vision and visual disturbance.   Cardiovascular: Negative for chest pain and leg swelling.   Respiratory: Negative for cough and shortness of breath.    Endocrine: Negative for polyuria.   Hematologic/Lymphatic: Negative for bleeding problem.   Skin: Negative for rash.   Musculoskeletal: Positive for joint pain. Negative for back pain, joint swelling, muscle cramps and muscle weakness.   Gastrointestinal: Negative for nausea and vomiting.   Genitourinary: Negative for hematuria.   Neurological: Negative for loss of balance, numbness and paresthesias.   Psychiatric/Behavioral: Negative for altered mental status.       Objective:   There is no height or weight on file to calculate BMI.  There were no vitals filed for this visit.             General    Constitutional: She is oriented to person, place, and time. She appears well-developed and well-nourished.   HENT:   Head: Normocephalic and atraumatic.   Right Ear: External ear normal.   Left Ear: External ear normal.   Nose: Nose normal.   Eyes: EOM are normal. Pupils are equal, round, and reactive to light. Right eye  exhibits no discharge. Left eye exhibits no discharge.   Neck: Normal range of motion.   Cardiovascular: Normal heart sounds.    Pulmonary/Chest: Effort normal. No respiratory distress.   Abdominal: Soft.   Neurological: She is alert and oriented to person, place, and time.   Psychiatric: She has a normal mood and affect. Her behavior is normal. Judgment and thought content normal.         Right Shoulder Exam     Inspection/Observation   Swelling: absent  Bruising: absent  Scars: absent  Deformity: absent  Scapular Winging: absent  Scapular Dyskinesia: negative  Atrophy: absent    Tenderness   The patient is experiencing no tenderness.    Range of Motion   Active abduction:  90 normal   Passive abduction:  100 normal   Extension:  0 normal   Forward Flexion:  180 normal   Forward Elevation: 180 normal  Adduction: 40 normal  External Rotation 0 degrees:  50 normal   External Rotation 90 degrees: 90 normal  Internal rotation 0 degrees:  T8 normal   Internal rotation 90 degrees:  30 normal     Tests & Signs   Cross arm: negative  Drop arm: negative  Flannery test: positive  Impingement: positive  Lift Off Sign: negative  Active Compression Test ([place]'s Sign): negative  Yergason's Test: negative  Speed's Test: negative    Other   Sensation: normal    Left Shoulder Exam     Inspection/Observation   Swelling: absent  Bruising: absent  Scars: absent  Deformity: absent  Scapular Winging: absent  Scapular Dyskinesia: negative  Atrophy: absent    Tenderness   The patient is experiencing no tenderness.     Range of Motion   Active abduction:  90 normal   Passive abduction:  100 normal   Extension:  0 normal   Forward Flexion:  180 normal   Forward Elevation: 180 normal  Adduction: 40 normal  External Rotation 0 degrees:  50 normal   External Rotation 90 degrees: 90 normal  Internal rotation 0 degrees:  T8 normal   Internal rotation 90 degrees:  30 normal     Tests & Signs   Cross arm: negative  Drop arm: negative  Flannery  test: negative  Impingement: negative  Lift Off Sign: negative  Active Compression test (Isabela's Sign): negative  Yergasons's Test: negative  Speed's Test: negative    Other   Sensation: normal       Muscle Strength   Right Upper Extremity   Shoulder Abduction: 5/5   Shoulder Internal Rotation: 5/5   Shoulder External Rotation: 5/5   Supraspinatus: 5/5/5   Subscapularis: 5/5/5   Biceps: 5/5/5   Left Upper Extremity  Shoulder Abduction: 5/5   Shoulder Internal Rotation: 5/5   Shoulder External Rotation: 5/5   Supraspinatus: 5/5/5   Subscapularis: 5/5/5   Biceps: 5/5/5     Vascular Exam     Right Pulses      Radial:                    2+      Left Pulses      Radial:                    2+      Capillary Refill  Right Hand: normal capillary refill  Left Hand: normal capillary refill      I have personally reviewed the following imaging and agree with the radiologist's findings of:   XR Shoulder: No acute fracture or dislocation.  Mild AC joint arthropathy is noted.  No significant degenerative change at the glenohumeral joint.    Assessment:     Encounter Diagnoses   Name Primary?    Rotator cuff tendonitis, right Yes    Acute pain of right shoulder         Plan:     Rice therapy  Mobic 15 mg - discussed changing to daily   Continue PT  Follow-up in 3 months    Patient verbalizes understanding and agrees with the above plan.    Luis Melo PA-C  Orthopedic Surgery/Sports Medicine        Disclaimer: This note was prepared using a voice recognition system and is likely to have sound alike errors within the text.

## 2019-08-15 ENCOUNTER — OFFICE VISIT (OUTPATIENT)
Dept: ORTHOPEDICS | Facility: CLINIC | Age: 54
End: 2019-08-15
Payer: COMMERCIAL

## 2019-08-15 VITALS
SYSTOLIC BLOOD PRESSURE: 118 MMHG | BODY MASS INDEX: 28.14 KG/M2 | DIASTOLIC BLOOD PRESSURE: 87 MMHG | HEART RATE: 81 BPM | WEIGHT: 190 LBS | HEIGHT: 69 IN

## 2019-08-15 DIAGNOSIS — M75.81 ROTATOR CUFF TENDONITIS, RIGHT: Primary | ICD-10-CM

## 2019-08-15 DIAGNOSIS — M25.511 ACUTE PAIN OF RIGHT SHOULDER: ICD-10-CM

## 2019-08-15 PROCEDURE — 3079F DIAST BP 80-89 MM HG: CPT | Mod: CPTII,S$GLB,, | Performed by: PHYSICIAN ASSISTANT

## 2019-08-15 PROCEDURE — 99999 PR PBB SHADOW E&M-EST. PATIENT-LVL III: ICD-10-PCS | Mod: PBBFAC,,, | Performed by: PHYSICIAN ASSISTANT

## 2019-08-15 PROCEDURE — 3008F BODY MASS INDEX DOCD: CPT | Mod: CPTII,S$GLB,, | Performed by: PHYSICIAN ASSISTANT

## 2019-08-15 PROCEDURE — 99999 PR PBB SHADOW E&M-EST. PATIENT-LVL III: CPT | Mod: PBBFAC,,, | Performed by: PHYSICIAN ASSISTANT

## 2019-08-15 PROCEDURE — 99214 OFFICE O/P EST MOD 30 MIN: CPT | Mod: S$GLB,,, | Performed by: PHYSICIAN ASSISTANT

## 2019-08-15 PROCEDURE — 3008F PR BODY MASS INDEX (BMI) DOCUMENTED: ICD-10-PCS | Mod: CPTII,S$GLB,, | Performed by: PHYSICIAN ASSISTANT

## 2019-08-15 PROCEDURE — 3074F SYST BP LT 130 MM HG: CPT | Mod: CPTII,S$GLB,, | Performed by: PHYSICIAN ASSISTANT

## 2019-08-15 PROCEDURE — 3079F PR MOST RECENT DIASTOLIC BLOOD PRESSURE 80-89 MM HG: ICD-10-PCS | Mod: CPTII,S$GLB,, | Performed by: PHYSICIAN ASSISTANT

## 2019-08-15 PROCEDURE — 99214 PR OFFICE/OUTPT VISIT, EST, LEVL IV, 30-39 MIN: ICD-10-PCS | Mod: S$GLB,,, | Performed by: PHYSICIAN ASSISTANT

## 2019-08-15 PROCEDURE — 3074F PR MOST RECENT SYSTOLIC BLOOD PRESSURE < 130 MM HG: ICD-10-PCS | Mod: CPTII,S$GLB,, | Performed by: PHYSICIAN ASSISTANT

## 2019-08-16 ENCOUNTER — PATIENT MESSAGE (OUTPATIENT)
Dept: INTERNAL MEDICINE | Facility: CLINIC | Age: 54
End: 2019-08-16

## 2019-08-22 ENCOUNTER — OFFICE VISIT (OUTPATIENT)
Dept: DERMATOLOGY | Facility: CLINIC | Age: 54
End: 2019-08-22
Payer: COMMERCIAL

## 2019-08-22 DIAGNOSIS — D22.9 MULTIPLE BENIGN NEVI: ICD-10-CM

## 2019-08-22 DIAGNOSIS — L82.1 SEBORRHEIC KERATOSES: ICD-10-CM

## 2019-08-22 DIAGNOSIS — Z12.83 SCREENING, MALIGNANT NEOPLASM, SKIN: ICD-10-CM

## 2019-08-22 DIAGNOSIS — L81.4 LENTIGO: Primary | ICD-10-CM

## 2019-08-22 DIAGNOSIS — D23.9 DERMATOFIBROMA: ICD-10-CM

## 2019-08-22 PROCEDURE — 99999 PR PBB SHADOW E&M-EST. PATIENT-LVL II: ICD-10-PCS | Mod: PBBFAC,,, | Performed by: STUDENT IN AN ORGANIZED HEALTH CARE EDUCATION/TRAINING PROGRAM

## 2019-08-22 PROCEDURE — 99213 PR OFFICE/OUTPT VISIT, EST, LEVL III, 20-29 MIN: ICD-10-PCS | Mod: S$GLB,,, | Performed by: STUDENT IN AN ORGANIZED HEALTH CARE EDUCATION/TRAINING PROGRAM

## 2019-08-22 PROCEDURE — 99999 PR PBB SHADOW E&M-EST. PATIENT-LVL II: CPT | Mod: PBBFAC,,, | Performed by: STUDENT IN AN ORGANIZED HEALTH CARE EDUCATION/TRAINING PROGRAM

## 2019-08-22 PROCEDURE — 99213 OFFICE O/P EST LOW 20 MIN: CPT | Mod: S$GLB,,, | Performed by: STUDENT IN AN ORGANIZED HEALTH CARE EDUCATION/TRAINING PROGRAM

## 2019-08-22 NOTE — PROGRESS NOTES
Subjective:       Patient ID:  Vera Greer is a 54 y.o. female who presents for   Chief Complaint   Patient presents with    Skin Check     tbse      History of Present Illness: The patient presents with chief complaint of evaluation of moles  Location:  Back, abdomen  Duration: months to years  Signs/Symptoms:  None   Prior treatments:  None   Pt denies any history of skin cancer         Review of Systems   Skin: Negative for itching, rash and dry skin.        Objective:    Physical Exam   Constitutional: She appears well-developed and well-nourished. No distress.   Neurological: She is alert and oriented to person, place, and time. She is not disoriented.   Psychiatric: She has a normal mood and affect.   Skin:   Areas Examined (abnormalities noted in diagram):   Scalp / Hair Palpated and Inspected  Head / Face Inspection Performed  Neck Inspection Performed  Chest / Axilla Inspection Performed  Abdomen Inspection Performed  Genitals / Buttocks / Groin Inspection Performed  Back Inspection Performed  RUE Inspected  LUE Inspection Performed  RLE Inspected  LLE Inspection Performed  Nails and Digits Inspection Performed                   Diagram Legend     Erythematous scaling macule/papule c/w actinic keratosis       Vascular papule c/w angioma      Pigmented verrucoid papule/plaque c/w seborrheic keratosis      Yellow umbilicated papule c/w sebaceous hyperplasia      Irregularly shaped tan macule c/w lentigo     1-2 mm smooth white papules consistent with Milia      Movable subcutaneous cyst with punctum c/w epidermal inclusion cyst      Subcutaneous movable cyst c/w pilar cyst      Firm pink to brown papule c/w dermatofibroma      Pedunculated fleshy papule(s) c/w skin tag(s)      Evenly pigmented macule c/w junctional nevus     Mildly variegated pigmented, slightly irregular-bordered macule c/w mildly atypical nevus      Flesh colored to evenly pigmented papule c/w intradermal nevus       Pink pearly  papule/plaque c/w basal cell carcinoma      Erythematous hyperkeratotic cursted plaque c/w SCC      Surgical scar with no sign of skin cancer recurrence      Open and closed comedones      Inflammatory papules and pustules      Verrucoid papule consistent consistent with wart     Erythematous eczematous patches and plaques     Dystrophic onycholytic nail with subungual debris c/w onychomycosis     Umbilicated papule    Erythematous-base heme-crusted tan verrucoid plaque consistent with inflamed seborrheic keratosis     Erythematous Silvery Scaling Plaque c/w Psoriasis     See annotation      Assessment / Plan:        Lentigo  This is a benign hyperpigmented sun induced lesion. Daily sun protection will reduce the number of new lesions. Treatment of these benign lesions are considered cosmetic.    Dermatofibroma  This is a benign scar-like lesion secondary to minor trauma. No treatment required.     Seborrheic keratoses  These are benign inherited growths without a malignant potential. Reassurance given to patient. No treatment is necessary.     Multiple benign nevi  Screening, malignant neoplasm, skin  total body skin examination performed today including at least 12 points as noted in physical examination. No lesions suspicious for malignancy noted.  Reassurance provided.  Instructed patient to observe lesion(s) for changes and follow up in clinic if changes are noted. Discussed ABCDE's of moles and brochure provided.         Follow up in about 1 year (around 8/22/2020).

## 2019-09-23 ENCOUNTER — PATIENT MESSAGE (OUTPATIENT)
Dept: INTERNAL MEDICINE | Facility: CLINIC | Age: 54
End: 2019-09-23

## 2019-09-24 NOTE — TELEPHONE ENCOUNTER
Please check on how her arm is doing: if still red and painful and not significantly better then should be checked to make sure not infection

## 2019-11-06 ENCOUNTER — DOCUMENTATION ONLY (OUTPATIENT)
Dept: REHABILITATION | Facility: HOSPITAL | Age: 54
End: 2019-11-06

## 2019-11-06 NOTE — PROGRESS NOTES
Outpatient Therapy Discharge Summary     Name: Vera ANTUNEZ [facility] Number: [id number]    Physician: Luis Melo, *     Visit Date: 8/9/2019  Physician Orders: PT Eval and Treat   Medical Diagnosis from Referral: M75.81 (ICD-10-CM) - Rotator cuff tendonitis, right  Evaluation Date: 7/23/2019  Authorization Period Expiration: 12/31/19  Plan of Care Expiration: 8/23/19  Visit # / Visits authorized:5/12    Discharge reason: Patient has not attended therapy since 8/9/19    Plan   This patient is discharged from Physical Therapy

## 2019-11-30 ENCOUNTER — PATIENT MESSAGE (OUTPATIENT)
Dept: DERMATOLOGY | Facility: CLINIC | Age: 54
End: 2019-11-30

## 2019-11-30 DIAGNOSIS — L85.3 XEROSIS CUTIS: Primary | ICD-10-CM

## 2019-12-03 ENCOUNTER — PATIENT MESSAGE (OUTPATIENT)
Dept: DERMATOLOGY | Facility: CLINIC | Age: 54
End: 2019-12-03

## 2019-12-03 RX ORDER — UREA 40 %
CREAM (GRAM) TOPICAL
Qty: 198.6 G | Refills: 3 | Status: SHIPPED | OUTPATIENT
Start: 2019-12-03 | End: 2020-06-25

## 2019-12-11 ENCOUNTER — PATIENT MESSAGE (OUTPATIENT)
Dept: DERMATOLOGY | Facility: CLINIC | Age: 54
End: 2019-12-11

## 2020-02-13 ENCOUNTER — PATIENT MESSAGE (OUTPATIENT)
Dept: INTERNAL MEDICINE | Facility: CLINIC | Age: 55
End: 2020-02-13

## 2020-02-13 DIAGNOSIS — F32.A DEPRESSION, UNSPECIFIED DEPRESSION TYPE: ICD-10-CM

## 2020-02-13 DIAGNOSIS — K58.9 IRRITABLE BOWEL SYNDROME WITHOUT DIARRHEA: ICD-10-CM

## 2020-02-13 RX ORDER — HYOSCYAMINE SULFATE 0.38 MG/1
0.38 TABLET, EXTENDED RELEASE ORAL EVERY 12 HOURS PRN
Qty: 180 TABLET | Refills: 3 | Status: SHIPPED | OUTPATIENT
Start: 2020-02-13 | End: 2021-02-19

## 2020-02-13 RX ORDER — BUPROPION HYDROCHLORIDE 75 MG/1
75 TABLET ORAL DAILY
Qty: 30 TABLET | Refills: 11 | Status: SHIPPED | OUTPATIENT
Start: 2020-02-13 | End: 2020-02-19

## 2020-02-19 ENCOUNTER — PATIENT MESSAGE (OUTPATIENT)
Dept: INTERNAL MEDICINE | Facility: CLINIC | Age: 55
End: 2020-02-19

## 2020-02-19 RX ORDER — BUPROPION HYDROCHLORIDE 150 MG/1
150 TABLET ORAL DAILY
Qty: 90 TABLET | Refills: 3 | Status: SHIPPED | OUTPATIENT
Start: 2020-02-19 | End: 2021-02-12

## 2020-03-19 ENCOUNTER — PATIENT MESSAGE (OUTPATIENT)
Dept: INTERNAL MEDICINE | Facility: CLINIC | Age: 55
End: 2020-03-19

## 2020-03-19 DIAGNOSIS — J01.90 ACUTE SINUSITIS, RECURRENCE NOT SPECIFIED, UNSPECIFIED LOCATION: Primary | ICD-10-CM

## 2020-03-26 RX ORDER — AZITHROMYCIN 250 MG/1
250 TABLET, FILM COATED ORAL DAILY
Qty: 6 TABLET | Refills: 0 | Status: SHIPPED | OUTPATIENT
Start: 2020-03-26 | End: 2020-03-31

## 2020-04-23 ENCOUNTER — PATIENT MESSAGE (OUTPATIENT)
Dept: INTERNAL MEDICINE | Facility: CLINIC | Age: 55
End: 2020-04-23

## 2020-05-07 RX ORDER — SPIRONOLACTONE 100 MG/1
100 TABLET, FILM COATED ORAL DAILY
Qty: 90 TABLET | Refills: 3 | Status: SHIPPED | OUTPATIENT
Start: 2020-05-07 | End: 2021-06-24

## 2020-06-25 ENCOUNTER — OFFICE VISIT (OUTPATIENT)
Dept: INTERNAL MEDICINE | Facility: CLINIC | Age: 55
End: 2020-06-25
Payer: COMMERCIAL

## 2020-06-25 VITALS — BODY MASS INDEX: 25.25 KG/M2 | WEIGHT: 171 LBS

## 2020-06-25 DIAGNOSIS — Z11.4 ENCOUNTER FOR SCREENING FOR HUMAN IMMUNODEFICIENCY VIRUS (HIV): ICD-10-CM

## 2020-06-25 DIAGNOSIS — E55.9 VITAMIN D DEFICIENCY: ICD-10-CM

## 2020-06-25 DIAGNOSIS — Z00.00 ROUTINE HEALTH MAINTENANCE: Primary | ICD-10-CM

## 2020-06-25 PROCEDURE — 99386 PR PREVENTIVE VISIT,NEW,40-64: ICD-10-PCS | Mod: 95,,, | Performed by: FAMILY MEDICINE

## 2020-06-25 PROCEDURE — 99386 PREV VISIT NEW AGE 40-64: CPT | Mod: 95,,, | Performed by: FAMILY MEDICINE

## 2020-06-25 RX ORDER — PROGESTERONE 100 MG/1
100 CAPSULE ORAL DAILY
COMMUNITY
End: 2021-06-24

## 2020-06-25 RX ORDER — LANOLIN ALCOHOL/MO/W.PET/CERES
100 CREAM (GRAM) TOPICAL DAILY
COMMUNITY
End: 2023-02-28

## 2020-06-25 NOTE — PROGRESS NOTES
Subjective:       Patient ID: Vera Greer is a 55 y.o. female.    Chief Complaint: phys  HPI The patient location is:home  The chief complaint leading to consultation is in hpi  Visit type: Virtual visit with synchronous audio and video  Total time spent with patient:20  Each patient to whom he or she provides medical services by telemedicine is:  (1) informed of the relationship between the physician and patient and the respective role of any other health care provider with respect to management of the patient; and (2) notified that he or she may decline to receive medical services by telemedicine and may withdraw from such care at any time.    Mood doing well and wants to cont wellbutrin    hypothy off med and nl tsh 4/20 outside dr;also nl cmp chol 239 ldl 174 a1c 5.7 tg 122   infreq migraine hx rf fiornal     elev chol : prev onstatin but no longer. cv risk not elev d/wd no longer needs statin; last lipid 4/20 outside  cv risk <3%    Vol wt loss since covid with improved exerc more    Started progesterone and b12 via dr zachary leone and feeling more energy    Past Medical History:   Diagnosis Date    Anxiety     Chronic edema     aldactone and for hirsutism?    Depression     GERD (gastroesophageal reflux disease)     Hyperlipidemia     Hypothyroid     Irritable bowel syndrome     Sleep-related bruxism     chronic benzo x years     Past Surgical History:   Procedure Laterality Date    ADENOIDECTOMY      APPENDECTOMY      BELT ABDOMINOPLASTY      BREAST SURGERY      reduction     CHOLECYSTECTOMY      COLONOSCOPY N/A 4/27/2016    Procedure: COLONOSCOPY;  Surgeon: Lisbeth Ruth MD;  [facility];  Service: Endoscopy;  Laterality: N/A;    HYSTERECTOMY      partial     TONSILLECTOMY       Family History   Problem Relation Age of Onset    Arthritis Mother     Heart disease Father     Hypertension Father     Colon cancer Neg Hx      Social History     Socioeconomic History     Marital status:      Spouse name: ROOSEVELT    Number of children: 4    Years of education: Not on file    Highest education level: Not on file   Occupational History    Not on file   Social Needs    Financial resource strain: Not hard at all    Food insecurity     Worry: Never true     Inability: Never true    Transportation needs     Medical: No     Non-medical: No   Tobacco Use    Smoking status: Never Smoker    Smokeless tobacco: Never Used   Substance and Sexual Activity    Alcohol use: Yes     Alcohol/week: 0.0 standard drinks     Frequency: Never     Comment: once a year     Drug use: No    Sexual activity: Yes     Partners: Male     Birth control/protection: Surgical   Lifestyle    Physical activity     Days per week: 2 days     Minutes per session: 40 min    Stress: Only a little   Relationships    Social connections     Talks on phone: Three times a week     Gets together: Once a week     Attends [employer] witness service: Not on file     Active member of club or organization: Yes     Attends meetings of clubs or organizations: More than 4 times per year     Relationship status:    Other Topics Concern    Are you pregnant or think you may be? No    Breast-feeding No   Social History Narrative             Review of Systems    Cardiovascular: no chest pain  Chest: no shortness of breath  Abd: no abd pain  Remainder review of systems negative    Objective:      Physical Exam  gen nad  A and o x3  Assessment:       1. Routine health maintenance    2. Vitamin D deficiency hx           Plan:       **f/u one year  Notify if wt cont to dcrease with current diet/exerc*    Note last home bp 120/87 couple weeks ago and she will monitor and let  Me know if elev

## 2020-11-16 ENCOUNTER — PATIENT MESSAGE (OUTPATIENT)
Dept: INTERNAL MEDICINE | Facility: CLINIC | Age: 55
End: 2020-11-16

## 2021-03-20 ENCOUNTER — IMMUNIZATION (OUTPATIENT)
Dept: INTERNAL MEDICINE | Facility: CLINIC | Age: 56
End: 2021-03-20
Payer: COMMERCIAL

## 2021-03-20 DIAGNOSIS — Z23 NEED FOR VACCINATION: Primary | ICD-10-CM

## 2021-03-20 PROCEDURE — 91300 COVID-19, MRNA, LNP-S, PF, 30 MCG/0.3 ML DOSE VACCINE: ICD-10-PCS | Mod: ,,, | Performed by: FAMILY MEDICINE

## 2021-03-20 PROCEDURE — 91300 COVID-19, MRNA, LNP-S, PF, 30 MCG/0.3 ML DOSE VACCINE: CPT | Mod: ,,, | Performed by: FAMILY MEDICINE

## 2021-03-20 PROCEDURE — 0001A COVID-19, MRNA, LNP-S, PF, 30 MCG/0.3 ML DOSE VACCINE: CPT | Mod: CV19,,, | Performed by: FAMILY MEDICINE

## 2021-03-20 PROCEDURE — 0001A COVID-19, MRNA, LNP-S, PF, 30 MCG/0.3 ML DOSE VACCINE: ICD-10-PCS | Mod: CV19,,, | Performed by: FAMILY MEDICINE

## 2021-04-10 ENCOUNTER — IMMUNIZATION (OUTPATIENT)
Dept: INTERNAL MEDICINE | Facility: CLINIC | Age: 56
End: 2021-04-10
Payer: COMMERCIAL

## 2021-04-10 DIAGNOSIS — Z23 NEED FOR VACCINATION: Primary | ICD-10-CM

## 2021-04-10 PROCEDURE — 0002A COVID-19, MRNA, LNP-S, PF, 30 MCG/0.3 ML DOSE VACCINE: CPT | Mod: PBBFAC | Performed by: FAMILY MEDICINE

## 2021-04-10 PROCEDURE — 91300 COVID-19, MRNA, LNP-S, PF, 30 MCG/0.3 ML DOSE VACCINE: CPT | Mod: PBBFAC | Performed by: FAMILY MEDICINE

## 2021-05-20 ENCOUNTER — PATIENT OUTREACH (OUTPATIENT)
Dept: ADMINISTRATIVE | Facility: HOSPITAL | Age: 56
End: 2021-05-20

## 2021-05-21 ENCOUNTER — PATIENT OUTREACH (OUTPATIENT)
Dept: ADMINISTRATIVE | Facility: HOSPITAL | Age: 56
End: 2021-05-21

## 2021-06-06 ENCOUNTER — PATIENT MESSAGE (OUTPATIENT)
Dept: INTERNAL MEDICINE | Facility: CLINIC | Age: 56
End: 2021-06-06

## 2021-06-24 ENCOUNTER — OFFICE VISIT (OUTPATIENT)
Dept: INTERNAL MEDICINE | Facility: CLINIC | Age: 56
End: 2021-06-24
Payer: COMMERCIAL

## 2021-06-24 VITALS
HEART RATE: 81 BPM | BODY MASS INDEX: 23.77 KG/M2 | SYSTOLIC BLOOD PRESSURE: 118 MMHG | DIASTOLIC BLOOD PRESSURE: 68 MMHG | WEIGHT: 160.5 LBS | TEMPERATURE: 98 F | OXYGEN SATURATION: 98 % | HEIGHT: 69 IN

## 2021-06-24 DIAGNOSIS — Z11.4 ENCOUNTER FOR SCREENING FOR HUMAN IMMUNODEFICIENCY VIRUS (HIV): ICD-10-CM

## 2021-06-24 DIAGNOSIS — Z00.00 ROUTINE HEALTH MAINTENANCE: Primary | ICD-10-CM

## 2021-06-24 DIAGNOSIS — Z11.59 NEED FOR HEPATITIS C SCREENING TEST: ICD-10-CM

## 2021-06-24 DIAGNOSIS — E78.5 HYPERLIPIDEMIA, UNSPECIFIED HYPERLIPIDEMIA TYPE: ICD-10-CM

## 2021-06-24 PROCEDURE — 1126F AMNT PAIN NOTED NONE PRSNT: CPT | Mod: S$GLB,,, | Performed by: FAMILY MEDICINE

## 2021-06-24 PROCEDURE — 99396 PREV VISIT EST AGE 40-64: CPT | Mod: S$GLB,,, | Performed by: FAMILY MEDICINE

## 2021-06-24 PROCEDURE — 99999 PR PBB SHADOW E&M-EST. PATIENT-LVL III: CPT | Mod: PBBFAC,,, | Performed by: FAMILY MEDICINE

## 2021-06-24 PROCEDURE — 3008F PR BODY MASS INDEX (BMI) DOCUMENTED: ICD-10-PCS | Mod: CPTII,S$GLB,, | Performed by: FAMILY MEDICINE

## 2021-06-24 PROCEDURE — 1126F PR PAIN SEVERITY QUANTIFIED, NO PAIN PRESENT: ICD-10-PCS | Mod: S$GLB,,, | Performed by: FAMILY MEDICINE

## 2021-06-24 PROCEDURE — 3008F BODY MASS INDEX DOCD: CPT | Mod: CPTII,S$GLB,, | Performed by: FAMILY MEDICINE

## 2021-06-24 PROCEDURE — 99999 PR PBB SHADOW E&M-EST. PATIENT-LVL III: ICD-10-PCS | Mod: PBBFAC,,, | Performed by: FAMILY MEDICINE

## 2021-06-24 PROCEDURE — 99396 PR PREVENTIVE VISIT,EST,40-64: ICD-10-PCS | Mod: S$GLB,,, | Performed by: FAMILY MEDICINE

## 2021-06-24 RX ORDER — ATORVASTATIN CALCIUM 40 MG/1
40 TABLET, FILM COATED ORAL DAILY
COMMUNITY
Start: 2021-05-15 | End: 2022-02-24

## 2021-06-24 RX ORDER — ORAL SEMAGLUTIDE 7 MG/1
7 TABLET ORAL DAILY
COMMUNITY
Start: 2021-06-21 | End: 2022-06-27

## 2021-07-07 ENCOUNTER — PATIENT OUTREACH (OUTPATIENT)
Dept: ADMINISTRATIVE | Facility: HOSPITAL | Age: 56
End: 2021-07-07

## 2021-07-08 ENCOUNTER — PATIENT OUTREACH (OUTPATIENT)
Dept: ADMINISTRATIVE | Facility: HOSPITAL | Age: 56
End: 2021-07-08

## 2021-12-29 ENCOUNTER — PATIENT MESSAGE (OUTPATIENT)
Dept: INTERNAL MEDICINE | Facility: CLINIC | Age: 56
End: 2021-12-29
Payer: COMMERCIAL

## 2021-12-29 DIAGNOSIS — K58.9 IRRITABLE BOWEL SYNDROME WITHOUT DIARRHEA: ICD-10-CM

## 2021-12-30 RX ORDER — HYOSCYAMINE SULFATE 0.38 MG/1
0.38 TABLET, EXTENDED RELEASE ORAL EVERY 12 HOURS PRN
Qty: 180 TABLET | Refills: 3 | Status: SHIPPED | OUTPATIENT
Start: 2021-12-30 | End: 2022-06-27 | Stop reason: SDUPTHER

## 2022-01-14 ENCOUNTER — PATIENT MESSAGE (OUTPATIENT)
Dept: INTERNAL MEDICINE | Facility: CLINIC | Age: 57
End: 2022-01-14
Payer: COMMERCIAL

## 2022-01-20 ENCOUNTER — PATIENT MESSAGE (OUTPATIENT)
Dept: INTERNAL MEDICINE | Facility: CLINIC | Age: 57
End: 2022-01-20
Payer: COMMERCIAL

## 2022-01-20 ENCOUNTER — TELEPHONE (OUTPATIENT)
Dept: INTERNAL MEDICINE | Facility: CLINIC | Age: 57
End: 2022-01-20
Payer: COMMERCIAL

## 2022-01-20 NOTE — TELEPHONE ENCOUNTER
See mychart reply, Hyoscyamine rx printed and faxed to [facility] F#[phone].

## 2022-01-20 NOTE — TELEPHONE ENCOUNTER
Per e-request rec'vd initiated prior auth request to pt's ins for hyoscyamine  0.375mg rx #180/90 submitted online via [email or web address] Request KEY [id number], w/ notation made that noted alternative they listed dicyclomine pt has tried and failed.

## 2022-02-04 ENCOUNTER — TELEPHONE (OUTPATIENT)
Dept: INTERNAL MEDICINE | Facility: CLINIC | Age: 57
End: 2022-02-04
Payer: COMMERCIAL

## 2022-02-09 NOTE — TELEPHONE ENCOUNTER
01/26/2022: [place]PR[place] rec'vd from pt's insurance Hyoscyamine 0.375mg tablet #180/90 rx DOS 01/22/2022 - 01/22/2023, faxed approved to pt's pharm instrc them to fill rx and notify pt when rx ready F#[phone].

## 2022-04-25 ENCOUNTER — PATIENT MESSAGE (OUTPATIENT)
Dept: HEMATOLOGY/ONCOLOGY | Facility: CLINIC | Age: 57
End: 2022-04-25
Payer: COMMERCIAL

## 2022-05-12 ENCOUNTER — PATIENT MESSAGE (OUTPATIENT)
Dept: HEMATOLOGY/ONCOLOGY | Facility: CLINIC | Age: 57
End: 2022-05-12

## 2022-05-12 ENCOUNTER — OFFICE VISIT (OUTPATIENT)
Dept: HEMATOLOGY/ONCOLOGY | Facility: CLINIC | Age: 57
End: 2022-05-12
Payer: COMMERCIAL

## 2022-05-12 DIAGNOSIS — R79.89 HIGH SERUM FERRITIN: Primary | ICD-10-CM

## 2022-05-12 DIAGNOSIS — K58.9 IRRITABLE BOWEL SYNDROME WITHOUT DIARRHEA: ICD-10-CM

## 2022-05-12 DIAGNOSIS — E78.5 HYPERLIPIDEMIA, UNSPECIFIED HYPERLIPIDEMIA TYPE: ICD-10-CM

## 2022-05-12 PROCEDURE — 99204 PR OFFICE/OUTPT VISIT, NEW, LEVL IV, 45-59 MIN: ICD-10-PCS | Mod: 95,,, | Performed by: STUDENT IN AN ORGANIZED HEALTH CARE EDUCATION/TRAINING PROGRAM

## 2022-05-12 PROCEDURE — 99204 OFFICE O/P NEW MOD 45 MIN: CPT | Mod: 95,,, | Performed by: STUDENT IN AN ORGANIZED HEALTH CARE EDUCATION/TRAINING PROGRAM

## 2022-05-12 NOTE — PROGRESS NOTES
Hematology- Oncology Clinic Note :      5/12/2022    RFV / chief complaint- Abnormal Lab (Elevated ferritin)        HPI  Pt is a 57 y.o. female who  has a past medical history of Anxiety, Chronic edema, Depression, GERD (gastroesophageal reflux disease), Hyperlipidemia, Hypothyroid, Irritable bowel syndrome, Osteopenia (03/2021), and Sleep-related bruxism.   Pt presents to the clinic today for elevated ferritin level.     Saw dermatologist for hair loss. Had blood work and biopsy on scalp     Labs on 04/18/2022 (in media) showed TIBC low at 234, iron 104, TSAT 44 and Ferritin was high at 264 and hematologist was recommended.     Also sees endocrinologist for high cholesterol and diabetes.  Lost weight and had improvement in A1c . Not on any meds for DM anymore    Today patient reports to be in her usual state of health she has no acute complaints.  Mentions intermittent mild stiffness in joints in the morning which improves later on in the day    pmhx- IBS on meds, wellbutrin for PMS  Used to be on thyroid medication in past but not anymore    S/p hysterectomy . UTD on MMG  personal hx of squamous and basal cell cancer on leg    Fhx - breast cancer in mother   Maternal GM with lung cancer   Maternal uncle with melanoma.   No known family history of any blood disorders.  Reviewed past medical/surgical/social history    Past Medical History:   Diagnosis Date    Anxiety     Chronic edema     aldactone and for hirsutism?    Depression     GERD (gastroesophageal reflux disease)     Hyperlipidemia     Hypothyroid     Irritable bowel syndrome     Osteopenia 03/2021    outside dexa/no meds needed    Sleep-related bruxism     chronic benzo x years      Past Surgical History:   Procedure Laterality Date    ADENOIDECTOMY      APPENDECTOMY      BELT ABDOMINOPLASTY      BREAST SURGERY      reduction     CHOLECYSTECTOMY      COLONOSCOPY N/A 4/27/2016    Procedure: COLONOSCOPY;  Surgeon: Lisbeth Ruth MD;   Location: [facility];  Service: Endoscopy;  Laterality: N/A;    HYSTERECTOMY      partial     TONSILLECTOMY        Review of patient's allergies indicates:   Allergen Reactions    Augmentin [amoxicillin-pot clavulanate]      Bad yeast infxn. ;she prefers other abx      Social History     Tobacco Use    Smoking status: Never Smoker    Smokeless tobacco: Never Used   Substance Use Topics    Alcohol use: Yes     Alcohol/week: 0.0 standard drinks     Comment: once a year       Family History   Problem Relation Age of Onset    Breast cancer Mother     Arthritis Mother     Heart disease Father     Hypertension Father     Breast cancer Maternal Grandmother     Colon cancer Neg Hx           Review of Systems :  Review of Systems   Constitutional: Negative for chills, diaphoresis, fever, malaise/fatigue and weight loss.   HENT: Negative.  Negative for congestion, hearing loss, nosebleeds, sore throat and tinnitus.    Eyes: Negative.  Negative for blurred vision and discharge.   Respiratory: Negative for cough, hemoptysis, sputum production, shortness of breath and wheezing.    Cardiovascular: Negative.  Negative for chest pain, palpitations and leg swelling.   Gastrointestinal: Negative.  Negative for abdominal pain, blood in stool, constipation, diarrhea, heartburn, melena, nausea and vomiting.   Genitourinary: Negative.    Musculoskeletal: Negative.  Negative for back pain, falls, joint pain and myalgias.   Skin: Negative.  Negative for itching and rash.   Neurological: Negative.  Negative for dizziness, tingling, sensory change, speech change, focal weakness, seizures, loss of consciousness, weakness and headaches.   Endo/Heme/Allergies: Bruises/bleeds easily.   Psychiatric/Behavioral: Negative.  Negative for depression. The patient is not nervous/anxious and does not have insomnia.                Physical Exam :  There were no vitals taken for this visit.  Wt Readings from Last 3 Encounters:   02/24/22 68.9 kg  (152 lb)   06/24/21 72.8 kg (160 lb 7.9 oz)   06/25/20 77.6 kg (171 lb)       There is no height or weight on file to calculate BMI.      Physical Exam  Constitutional:       General: She is not in acute distress.     Appearance: Normal appearance. She is not ill-appearing.   HENT:      Head: Normocephalic and atraumatic.      Right Ear: External ear normal.      Left Ear: External ear normal.   Eyes:      General: No scleral icterus.        Right eye: No discharge.         Left eye: No discharge.   Pulmonary:      Effort: Pulmonary effort is normal. No respiratory distress.   Skin:     Coloration: Skin is not jaundiced.      Findings: No erythema or rash.   Neurological:      Mental Status: She is alert and oriented to person, place, and time. Mental status is at baseline.   Psychiatric:         Mood and Affect: Mood normal.         Speech: Speech normal.         Behavior: Behavior normal.             Current Outpatient Medications   Medication Sig Dispense Refill    buPROPion (WELLBUTRIN XL) 150 MG TB24 tablet TAKE 1 TABLET ONCE DAILY 90 tablet 3    CALCIUM/MAGNESIUM/VIT D3 (HEALTHY BONE FORMULA ORAL) Take by mouth.      cyanocobalamin (VITAMIN B-12) 1000 MCG tablet Take 100 mcg by mouth once daily.      DHA/EPA/MV/DNG JUANI/HRB42 (PMS WOMENS FORMULA ORAL) Take by mouth. phytoestrogen      ergocalciferol, vitamin D2, (VITAMIN D2 ORAL) Take 250 mg by mouth.      hyoscyamine (LEVBID) 0.375 mg Tb12 Take 1 tablet (0.375 mg total) by mouth every 12 (twelve) hours as needed. 180 tablet 3    multivitamin capsule Take by mouth. 1 Capsule Oral Every day      omega-3 fatty acids-vitamin E (OMEGA-3 FISH OIL) 1,000-5 mg-unit Cap Take by mouth. 1 Capsule Oral Every day      omeprazole (PRILOSEC) 20 MG capsule Take 20 mg by mouth once a week.      RYBELSUS 7 mg tablet Take 7 mg by mouth once daily.      SOY ISOFLAVONES ORAL Take 40 mg by mouth once daily.       No current facility-administered medications for this  visit.       Pertinent Diagnostic studies:      No visits with results within 1 Week(s) from this visit.   Latest known visit with results is:   Office Visit on 02/24/2022   Component Date Value Ref Range Status    DIAGNOSIS: 02/24/2022 Comment   Final    NEGATIVE FOR INTRAEPITHELIAL LESION OR MALIGNANCY.    Specimen adequacy: 02/24/2022 Comment   Final    Satisfactory for evaluation. No endocervical component is identified.    Clinician Provided ICD10 02/24/2022 Comment   Final    Comment: Z01.419  Z12.4      Performed by: 02/24/2022 Comment   Final    Constantin Goodrich, Cytotechnologist (ASCP)    . 02/24/2022 .   Final    Note: 02/24/2022 Comment   Final    Comment: The Pap smear is a screening test designed to aid in the detection of  premalignant and malignant conditions of the uterine cervix.  It is not a  diagnostic procedure and should not be used as the sole means of detecting  cervical cancer.  Both false-positive and false-negative reports do occur.      Test Methodology: 02/24/2022 Comment   Final    Comment: This liquid based ThinPrep(R) pap test was screened with the  use of an image guided system.      . 02/24/2022 Comment   Final    Comment: The HPV DNA reflex criteria were not met with this specimen result  therefore, no HPV testing was performed.             Oncology History    No history exists.     Assessment :     1. High serum ferritin    2. Hyperlipidemia, unspecified hyperlipidemia type    3. Irritable bowel syndrome without diarrhea        Plan :       Labs iron profile and ferritin in the media were reviewed.  Advised to discontinue any supplements with iron   Will obtain basic workup as below and follow-up patient wishes to get labs at [facility].    Orders Placed This Encounter    CBC W/ AUTO DIFFERENTIAL    Comprehensive Metabolic Panel    FERRITIN    IRON AND TIBC    PROTIME-INR    APTT    TSH            Electronically signed by Joy Gongora    [facility] Appointments   Date Time Provider Department Center   6/27/2022  1:40 PM Taz Cartagena MD [facility] [place]   2/28/2023 11:20 AM [facility]  [id number] SCREEN [facility] MAMMO [place] [facility]   3/28/2023 10:20 AM Moses Caceres MD [facility] OBGYN [place] [facility]     The patient location is: [place]  The chief complaint leading to consultation is: see chief complaint below    Visit type: audiovisual    Face to Face time with patient: 20  45 minutes of total time spent on the encounter, which includes face to face time and non-face to face time preparing to see the patient (eg, review of tests), Obtaining and/or reviewing separately obtained history, Documenting clinical information in the electronic or other health record, Independently interpreting results (not separately reported) and communicating results to the patient/family/caregiver, or Care coordination (not separately reported).         Each patient to whom he or she provides medical services by telemedicine is:  (1) informed of the relationship between the physician and patient and the respective role of any other health care provider with respect to management of the patient; and (2) notified that he or she may decline to receive medical services by telemedicine and may withdraw from such care at any time.      This note was created with voice recognition software.  Grammatical, syntax and spelling errors may be inevitable.

## 2022-05-12 NOTE — Clinical Note
Labs ordered. Pt prefers to get labs at [facility]. BR patient. Please coordinate and assist.  Obtain records from dermatology RTC 4 to 6 weeks

## 2022-05-18 ENCOUNTER — PATIENT MESSAGE (OUTPATIENT)
Dept: INTERNAL MEDICINE | Facility: CLINIC | Age: 57
End: 2022-05-18
Payer: COMMERCIAL

## 2022-05-24 ENCOUNTER — LAB VISIT (OUTPATIENT)
Dept: LAB | Facility: HOSPITAL | Age: 57
End: 2022-05-24
Attending: STUDENT IN AN ORGANIZED HEALTH CARE EDUCATION/TRAINING PROGRAM
Payer: COMMERCIAL

## 2022-05-24 DIAGNOSIS — R79.89 HIGH SERUM FERRITIN: ICD-10-CM

## 2022-05-24 LAB
ALBUMIN SERPL BCP-MCNC: 4 G/DL (ref 3.5–5.2)
ALP SERPL-CCNC: 68 U/L (ref 55–135)
ALT SERPL W/O P-5'-P-CCNC: 27 U/L (ref 10–44)
ANION GAP SERPL CALC-SCNC: 8 MMOL/L (ref 8–16)
APTT BLDCRRT: 26 SEC (ref 21–32)
AST SERPL-CCNC: 19 U/L (ref 10–40)
BASOPHILS # BLD AUTO: 0.02 K/UL (ref 0–0.2)
BASOPHILS NFR BLD: 0.3 % (ref 0–1.9)
BILIRUB SERPL-MCNC: 0.8 MG/DL (ref 0.1–1)
BUN SERPL-MCNC: 10 MG/DL (ref 6–20)
CALCIUM SERPL-MCNC: 9.4 MG/DL (ref 8.7–10.5)
CHLORIDE SERPL-SCNC: 107 MMOL/L (ref 95–110)
CO2 SERPL-SCNC: 28 MMOL/L (ref 23–29)
CREAT SERPL-MCNC: 0.8 MG/DL (ref 0.5–1.4)
DIFFERENTIAL METHOD: NORMAL
EOSINOPHIL # BLD AUTO: 0.1 K/UL (ref 0–0.5)
EOSINOPHIL NFR BLD: 1.2 % (ref 0–8)
ERYTHROCYTE [DISTWIDTH] IN BLOOD BY AUTOMATED COUNT: 12.8 % (ref 11.5–14.5)
EST. GFR  (AFRICAN AMERICAN): >60 ML/MIN/1.73 M^2
EST. GFR  (NON AFRICAN AMERICAN): >60 ML/MIN/1.73 M^2
FERRITIN SERPL-MCNC: 233 NG/ML (ref 20–300)
GLUCOSE SERPL-MCNC: 97 MG/DL (ref 70–110)
HCT VFR BLD AUTO: 42.8 % (ref 37–48.5)
HGB BLD-MCNC: 14.2 G/DL (ref 12–16)
IMM GRANULOCYTES # BLD AUTO: 0.03 K/UL (ref 0–0.04)
IMM GRANULOCYTES NFR BLD AUTO: 0.4 % (ref 0–0.5)
INR PPP: 0.9 (ref 0.8–1.2)
IRON SERPL-MCNC: 97 UG/DL (ref 30–160)
LYMPHOCYTES # BLD AUTO: 3 K/UL (ref 1–4.8)
LYMPHOCYTES NFR BLD: 38.4 % (ref 18–48)
MCH RBC QN AUTO: 30.4 PG (ref 27–31)
MCHC RBC AUTO-ENTMCNC: 33.2 G/DL (ref 32–36)
MCV RBC AUTO: 92 FL (ref 82–98)
MONOCYTES # BLD AUTO: 0.6 K/UL (ref 0.3–1)
MONOCYTES NFR BLD: 7.7 % (ref 4–15)
NEUTROPHILS # BLD AUTO: 4.1 K/UL (ref 1.8–7.7)
NEUTROPHILS NFR BLD: 52 % (ref 38–73)
NRBC BLD-RTO: 0 /100 WBC
PLATELET # BLD AUTO: 242 K/UL (ref 150–450)
PMV BLD AUTO: 10.2 FL (ref 9.2–12.9)
POTASSIUM SERPL-SCNC: 4 MMOL/L (ref 3.5–5.1)
PROT SERPL-MCNC: 7 G/DL (ref 6–8.4)
PROTHROMBIN TIME: 10.3 SEC (ref 9–12.5)
RBC # BLD AUTO: 4.67 M/UL (ref 4–5.4)
SATURATED IRON: 34 % (ref 20–50)
SODIUM SERPL-SCNC: 143 MMOL/L (ref 136–145)
TOTAL IRON BINDING CAPACITY: 287 UG/DL (ref 250–450)
TRANSFERRIN SERPL-MCNC: 194 MG/DL (ref 200–375)
TSH SERPL DL<=0.005 MIU/L-ACNC: 0.64 UIU/ML (ref 0.4–4)
WBC # BLD AUTO: 7.82 K/UL (ref 3.9–12.7)

## 2022-05-24 PROCEDURE — 80053 COMPREHEN METABOLIC PANEL: CPT | Performed by: STUDENT IN AN ORGANIZED HEALTH CARE EDUCATION/TRAINING PROGRAM

## 2022-05-24 PROCEDURE — 85730 THROMBOPLASTIN TIME PARTIAL: CPT | Performed by: STUDENT IN AN ORGANIZED HEALTH CARE EDUCATION/TRAINING PROGRAM

## 2022-05-24 PROCEDURE — 84443 ASSAY THYROID STIM HORMONE: CPT | Performed by: STUDENT IN AN ORGANIZED HEALTH CARE EDUCATION/TRAINING PROGRAM

## 2022-05-24 PROCEDURE — 82728 ASSAY OF FERRITIN: CPT | Performed by: STUDENT IN AN ORGANIZED HEALTH CARE EDUCATION/TRAINING PROGRAM

## 2022-05-24 PROCEDURE — 36415 COLL VENOUS BLD VENIPUNCTURE: CPT | Performed by: STUDENT IN AN ORGANIZED HEALTH CARE EDUCATION/TRAINING PROGRAM

## 2022-05-24 PROCEDURE — 85610 PROTHROMBIN TIME: CPT | Performed by: STUDENT IN AN ORGANIZED HEALTH CARE EDUCATION/TRAINING PROGRAM

## 2022-05-24 PROCEDURE — 85025 COMPLETE CBC W/AUTO DIFF WBC: CPT | Performed by: STUDENT IN AN ORGANIZED HEALTH CARE EDUCATION/TRAINING PROGRAM

## 2022-05-24 PROCEDURE — 84466 ASSAY OF TRANSFERRIN: CPT | Performed by: STUDENT IN AN ORGANIZED HEALTH CARE EDUCATION/TRAINING PROGRAM

## 2022-06-01 ENCOUNTER — PATIENT MESSAGE (OUTPATIENT)
Dept: INTERNAL MEDICINE | Facility: CLINIC | Age: 57
End: 2022-06-01
Payer: COMMERCIAL

## 2022-06-15 ENCOUNTER — PATIENT MESSAGE (OUTPATIENT)
Dept: HEMATOLOGY/ONCOLOGY | Facility: CLINIC | Age: 57
End: 2022-06-15
Payer: COMMERCIAL

## 2022-06-16 ENCOUNTER — OFFICE VISIT (OUTPATIENT)
Dept: HEMATOLOGY/ONCOLOGY | Facility: CLINIC | Age: 57
End: 2022-06-16
Payer: COMMERCIAL

## 2022-06-16 DIAGNOSIS — K58.9 IRRITABLE BOWEL SYNDROME WITHOUT DIARRHEA: ICD-10-CM

## 2022-06-16 DIAGNOSIS — R79.89 HIGH SERUM FERRITIN: Primary | ICD-10-CM

## 2022-06-16 DIAGNOSIS — E78.5 HYPERLIPIDEMIA, UNSPECIFIED HYPERLIPIDEMIA TYPE: ICD-10-CM

## 2022-06-16 PROCEDURE — 1159F PR MEDICATION LIST DOCUMENTED IN MEDICAL RECORD: ICD-10-PCS | Mod: CPTII,95,, | Performed by: STUDENT IN AN ORGANIZED HEALTH CARE EDUCATION/TRAINING PROGRAM

## 2022-06-16 PROCEDURE — 1160F RVW MEDS BY RX/DR IN RCRD: CPT | Mod: CPTII,95,, | Performed by: STUDENT IN AN ORGANIZED HEALTH CARE EDUCATION/TRAINING PROGRAM

## 2022-06-16 PROCEDURE — 99213 PR OFFICE/OUTPT VISIT, EST, LEVL III, 20-29 MIN: ICD-10-PCS | Mod: 95,,, | Performed by: STUDENT IN AN ORGANIZED HEALTH CARE EDUCATION/TRAINING PROGRAM

## 2022-06-16 PROCEDURE — 1159F MED LIST DOCD IN RCRD: CPT | Mod: CPTII,95,, | Performed by: STUDENT IN AN ORGANIZED HEALTH CARE EDUCATION/TRAINING PROGRAM

## 2022-06-16 PROCEDURE — 1160F PR REVIEW ALL MEDS BY PRESCRIBER/CLIN PHARMACIST DOCUMENTED: ICD-10-PCS | Mod: CPTII,95,, | Performed by: STUDENT IN AN ORGANIZED HEALTH CARE EDUCATION/TRAINING PROGRAM

## 2022-06-16 PROCEDURE — 99213 OFFICE O/P EST LOW 20 MIN: CPT | Mod: 95,,, | Performed by: STUDENT IN AN ORGANIZED HEALTH CARE EDUCATION/TRAINING PROGRAM

## 2022-06-16 NOTE — PROGRESS NOTES
Hematology- Oncology Clinic Note :      6/16/2022    RFV / chief complaint- Abnormal Lab        HPI  Pt is a 57 y.o. female who  has a past medical history of Anxiety, Chronic edema, Depression, GERD (gastroesophageal reflux disease), Hyperlipidemia, Hypothyroid, Irritable bowel syndrome, Osteopenia (03/2021), and Sleep-related bruxism.   Pt presents to the clinic today for elevated ferritin level.     Saw dermatologist for hair loss. Had blood work and biopsy on scalp     Labs on 04/18/2022 (in media) showed TIBC low at 234, iron 104, TSAT 44 and Ferritin was high at 264 and hematologist was recommended.     Also sees endocrinologist for high cholesterol and diabetes.  Lost weight and had improvement in A1c . Not on any meds for DM anymore    Today patient reports to be in her usual state of health she has no acute complaints.  Mentions intermittent mild stiffness in joints in the morning which improves later on in the day    pmhx- IBS on meds, wellbutrin for PMS  Used to be on thyroid medication in past but not anymore    S/p hysterectomy . UTD on MMG  personal hx of squamous and basal cell cancer on leg    Fhx - breast cancer in mother   Maternal GM with lung cancer   Maternal uncle with melanoma.   No known family history of any blood disorders.  Reviewed past medical/surgical/social history    Past Medical History:   Diagnosis Date    Anxiety     Chronic edema     aldactone and for hirsutism?    Depression     GERD (gastroesophageal reflux disease)     Hyperlipidemia     Hypothyroid     Irritable bowel syndrome     Osteopenia 03/2021    outside dexa/no meds needed    Sleep-related bruxism     chronic benzo x years      Past Surgical History:   Procedure Laterality Date    ADENOIDECTOMY      APPENDECTOMY      BELT ABDOMINOPLASTY      BREAST SURGERY      reduction     CHOLECYSTECTOMY      COLONOSCOPY N/A 4/27/2016    Procedure: COLONOSCOPY;  Surgeon: Lisbeth Ruth MD;  Location: [facility];   Service: Endoscopy;  Laterality: N/A;    HYSTERECTOMY      partial     TONSILLECTOMY        Review of patient's allergies indicates:   Allergen Reactions    Augmentin [amoxicillin-pot clavulanate]      Bad yeast infxn. ;she prefers other abx      Social History     Tobacco Use    Smoking status: Never Smoker    Smokeless tobacco: Never Used   Substance Use Topics    Alcohol use: Yes     Alcohol/week: 0.0 standard drinks     Comment: once a year       Family History   Problem Relation Age of Onset    Breast cancer Mother     Arthritis Mother     Heart disease Father     Hypertension Father     Breast cancer Maternal Grandmother     Colon cancer Neg Hx           Review of Systems :  Review of Systems   Constitutional: Negative for chills, diaphoresis, fever, malaise/fatigue and weight loss.   HENT: Negative.  Negative for congestion, hearing loss, nosebleeds, sore throat and tinnitus.    Eyes: Negative.  Negative for blurred vision and discharge.   Respiratory: Negative for cough, hemoptysis, sputum production, shortness of breath and wheezing.    Cardiovascular: Negative.  Negative for chest pain, palpitations and leg swelling.   Gastrointestinal: Negative.  Negative for abdominal pain, blood in stool, constipation, diarrhea, heartburn, melena, nausea and vomiting.   Genitourinary: Negative.    Musculoskeletal: Negative.  Negative for back pain, falls, joint pain and myalgias.   Skin: Negative.  Negative for itching and rash.   Neurological: Negative.  Negative for dizziness, tingling, sensory change, speech change, focal weakness, seizures, loss of consciousness, weakness and headaches.   Endo/Heme/Allergies: Does not bruise/bleed easily.   Psychiatric/Behavioral: Negative.  Negative for depression. The patient is not nervous/anxious and does not have insomnia.                Physical Exam :  There were no vitals taken for this visit.  Wt Readings from Last 3 Encounters:   02/24/22 68.9 kg (152 lb)    06/24/21 72.8 kg (160 lb 7.9 oz)   06/25/20 77.6 kg (171 lb)       There is no height or weight on file to calculate BMI.      Physical Exam  Constitutional:       General: She is not in acute distress.     Appearance: Normal appearance. She is not ill-appearing.   HENT:      Head: Normocephalic and atraumatic.      Right Ear: External ear normal.      Left Ear: External ear normal.   Eyes:      General: No scleral icterus.        Right eye: No discharge.         Left eye: No discharge.   Pulmonary:      Effort: Pulmonary effort is normal. No respiratory distress.   Skin:     Coloration: Skin is not jaundiced.      Findings: No erythema or rash.   Neurological:      Mental Status: She is alert and oriented to person, place, and time. Mental status is at baseline.   Psychiatric:         Mood and Affect: Mood normal.         Speech: Speech normal.         Behavior: Behavior normal.             Current Outpatient Medications   Medication Sig Dispense Refill    buPROPion (WELLBUTRIN XL) 150 MG TB24 tablet TAKE 1 TABLET ONCE DAILY 90 tablet 3    CALCIUM/MAGNESIUM/VIT D3 (HEALTHY BONE FORMULA ORAL) Take by mouth.      cyanocobalamin (VITAMIN B-12) 1000 MCG tablet Take 100 mcg by mouth once daily.      DHA/EPA/MV/DNG JUANI/HRB42 (PMS WOMENS FORMULA ORAL) Take by mouth. phytoestrogen      ergocalciferol, vitamin D2, (VITAMIN D2 ORAL) Take 250 mg by mouth.      hyoscyamine (LEVBID) 0.375 mg Tb12 Take 1 tablet (0.375 mg total) by mouth every 12 (twelve) hours as needed. 180 tablet 3    multivitamin capsule Take by mouth. 1 Capsule Oral Every day      omega-3 fatty acids-vitamin E (OMEGA-3 FISH OIL) 1,000-5 mg-unit Cap Take by mouth. 1 Capsule Oral Every day      omeprazole (PRILOSEC) 20 MG capsule Take 20 mg by mouth once a week.      RYBELSUS 7 mg tablet Take 7 mg by mouth once daily.      SOY ISOFLAVONES ORAL Take 40 mg by mouth once daily.       No current facility-administered medications for this visit.        Pertinent Diagnostic studies:      No visits with results within 1 Week(s) from this visit.   Latest known visit with results is:   Lab Visit on 05/24/2022   Component Date Value Ref Range Status    WBC 05/24/2022 7.82  3.90 - 12.70 K/uL Final    RBC 05/24/2022 4.67  4.00 - 5.40 M/uL Final    Hemoglobin 05/24/2022 14.2  12.0 - 16.0 g/dL Final    Hematocrit 05/24/2022 42.8  37.0 - 48.5 % Final    MCV 05/24/2022 92  82 - 98 fL Final    MCH 05/24/2022 30.4  27.0 - 31.0 pg Final    MCHC 05/24/2022 33.2  32.0 - 36.0 g/dL Final    RDW 05/24/2022 12.8  11.5 - 14.5 % Final    Platelets 05/24/2022 242  150 - 450 K/uL Final    MPV 05/24/2022 10.2  9.2 - 12.9 fL Final    Immature Granulocytes 05/24/2022 0.4  0.0 - 0.5 % Final    Gran # (ANC) 05/24/2022 4.1  1.8 - 7.7 K/uL Final    Immature Grans (Abs) 05/24/2022 0.03  0.00 - 0.04 K/uL Final    Comment: Mild elevation in immature granulocytes is non specific and   can be seen in a variety of conditions including stress response,   acute inflammation, trauma and pregnancy. Correlation with other   laboratory and clinical findings is essential.      Lymph # 05/24/2022 3.0  1.0 - 4.8 K/uL Final    Mono # 05/24/2022 0.6  0.3 - 1.0 K/uL Final    Eos # 05/24/2022 0.1  0.0 - 0.5 K/uL Final    Baso # 05/24/2022 0.02  0.00 - 0.20 K/uL Final    nRBC 05/24/2022 0  0 /100 WBC Final    Gran % 05/24/2022 52.0  38.0 - 73.0 % Final    Lymph % 05/24/2022 38.4  18.0 - 48.0 % Final    Mono % 05/24/2022 7.7  4.0 - 15.0 % Final    Eosinophil % 05/24/2022 1.2  0.0 - 8.0 % Final    Basophil % 05/24/2022 0.3  0.0 - 1.9 % Final    Differential Method 05/24/2022 Automated   Final    Sodium 05/24/2022 143  136 - 145 mmol/L Final    Potassium 05/24/2022 4.0  3.5 - 5.1 mmol/L Final    Chloride 05/24/2022 107  95 - 110 mmol/L Final    CO2 05/24/2022 28  23 - 29 mmol/L Final    Glucose 05/24/2022 97  70 - 110 mg/dL Final    BUN 05/24/2022 10  6 - 20 mg/dL Final     Creatinine 05/24/2022 0.8  0.5 - 1.4 mg/dL Final    Calcium 05/24/2022 9.4  8.7 - 10.5 mg/dL Final    Total Protein 05/24/2022 7.0  6.0 - 8.4 g/dL Final    Albumin 05/24/2022 4.0  3.5 - 5.2 g/dL Final    Total Bilirubin 05/24/2022 0.8  0.1 - 1.0 mg/dL Final    Comment: For infants and newborns, interpretation of results should be based  on gestational age, weight and in agreement with clinical  observations.    Premature Infant recommended reference ranges:  Up to 24 hours.............<8.0 mg/dL  Up to 48 hours............<12.0 mg/dL  3-5 days..................<15.0 mg/dL  6-29 days.................<15.0 mg/dL      Alkaline Phosphatase 05/24/2022 68  55 - 135 U/L Final    AST 05/24/2022 19  10 - 40 U/L Final    ALT 05/24/2022 27  10 - 44 U/L Final    Anion Gap 05/24/2022 8  8 - 16 mmol/L Final    eGFR if African American 05/24/2022 >60  >60 mL/min/1.73 m^2 Final    eGFR if non African American 05/24/2022 >60  >60 mL/min/1.73 m^2 Final    Comment: Calculation used to obtain the estimated glomerular filtration  rate (eGFR) is the CKD-EPI equation.       Ferritin 05/24/2022 233  20.0 - 300.0 ng/mL Final    Iron 05/24/2022 97  30 - 160 ug/dL Final    Transferrin 05/24/2022 194 (A) 200 - 375 mg/dL Final    TIBC 05/24/2022 287  250 - 450 ug/dL Final    Saturated Iron 05/24/2022 34  20 - 50 % Final    Prothrombin Time 05/24/2022 10.3  9.0 - 12.5 sec Final    INR 05/24/2022 0.9  0.8 - 1.2 Final    Comment: Coumadin Therapy:  2.0 - 3.0 for INR for all indicators except mechanical heart valves  and antiphospholipid syndromes which should use 2.5 - 3.5.      aPTT 05/24/2022 26.0  21.0 - 32.0 sec Final    aPTT therapeutic range = 39-69 seconds    TSH 05/24/2022 0.644  0.400 - 4.000 uIU/mL Final           Oncology History    No history exists.     Assessment :     1. High serum ferritin    2. Irritable bowel syndrome without diarrhea    3. Hyperlipidemia, unspecified hyperlipidemia type        Plan :        Labs iron profile and ferritin in the media were reviewed.   Advised to discontinue any supplements with iron  . Repeat iron profile wnl.   Pt wishes to f/u with hematologist. RTC 1 year with labs prior                 Electronically signed by Joy Gongora    [facility] Future Appointments   Date Time Provider Department Center   6/27/2022  1:40 PM Taz Cartagena MD [facility] IM [facility] [place]   2/28/2023 11:20 AM LW  [id number] SCREEN [facility] MAMMO [place] [facility]   3/28/2023 10:20 AM Moses Caceres MD [facility] OBGYN [place] [facility]     The patient location is: [place]  The chief complaint leading to consultation is: see chief complaint below    Visit type: audiovisual    Face to Face time with patient: 10  20minutes of total time spent on the encounter, which includes face to face time and non-face to face time preparing to see the patient (eg, review of tests), Obtaining and/or reviewing separately obtained history, Documenting clinical information in the electronic or other health record, Independently interpreting results (not separately reported) and communicating results to the patient/family/caregiver, or Care coordination (not separately reported).         Each patient to whom he or she provides medical services by telemedicine is:  (1) informed of the relationship between the physician and patient and the respective role of any other health care provider with respect to management of the patient; and (2) notified that he or she may decline to receive medical services by telemedicine and may withdraw from such care at any time.      This note was created with voice recognition software.  Grammatical, syntax and spelling errors may be inevitable.

## 2022-06-23 NOTE — TELEPHONE ENCOUNTER
6/22/2022 Release of Information has been faxed to Dr Sushma Perez--[facility]. Fax# [phone], phone #[phone]

## 2022-06-27 ENCOUNTER — LAB VISIT (OUTPATIENT)
Dept: LAB | Facility: HOSPITAL | Age: 57
End: 2022-06-27
Attending: FAMILY MEDICINE
Payer: COMMERCIAL

## 2022-06-27 ENCOUNTER — OFFICE VISIT (OUTPATIENT)
Dept: INTERNAL MEDICINE | Facility: CLINIC | Age: 57
End: 2022-06-27
Payer: COMMERCIAL

## 2022-06-27 VITALS
DIASTOLIC BLOOD PRESSURE: 78 MMHG | WEIGHT: 162.69 LBS | SYSTOLIC BLOOD PRESSURE: 110 MMHG | HEART RATE: 74 BPM | HEIGHT: 69 IN | TEMPERATURE: 98 F | OXYGEN SATURATION: 98 % | BODY MASS INDEX: 24.09 KG/M2

## 2022-06-27 DIAGNOSIS — Z11.4 ENCOUNTER FOR SCREENING FOR HUMAN IMMUNODEFICIENCY VIRUS (HIV): ICD-10-CM

## 2022-06-27 DIAGNOSIS — K58.9 IRRITABLE BOWEL SYNDROME WITHOUT DIARRHEA: ICD-10-CM

## 2022-06-27 DIAGNOSIS — Z11.59 NEED FOR HEPATITIS C SCREENING TEST: ICD-10-CM

## 2022-06-27 DIAGNOSIS — F32.A DEPRESSION, UNSPECIFIED DEPRESSION TYPE: ICD-10-CM

## 2022-06-27 DIAGNOSIS — E88.819 INSULIN RESISTANCE: ICD-10-CM

## 2022-06-27 DIAGNOSIS — Z00.00 ROUTINE HEALTH MAINTENANCE: Primary | ICD-10-CM

## 2022-06-27 LAB
CHOLEST SERPL-MCNC: 251 MG/DL (ref 120–199)
CHOLEST/HDLC SERPL: 4.7 {RATIO} (ref 2–5)
ESTIMATED AVG GLUCOSE: 100 MG/DL (ref 68–131)
HBA1C MFR BLD: 5.1 % (ref 4–5.6)
HDLC SERPL-MCNC: 53 MG/DL (ref 40–75)
HDLC SERPL: 21.1 % (ref 20–50)
LDLC SERPL CALC-MCNC: 174.2 MG/DL (ref 63–159)
NONHDLC SERPL-MCNC: 198 MG/DL
TRIGL SERPL-MCNC: 119 MG/DL (ref 30–150)

## 2022-06-27 PROCEDURE — 3008F PR BODY MASS INDEX (BMI) DOCUMENTED: ICD-10-PCS | Mod: CPTII,S$GLB,, | Performed by: FAMILY MEDICINE

## 2022-06-27 PROCEDURE — 3008F BODY MASS INDEX DOCD: CPT | Mod: CPTII,S$GLB,, | Performed by: FAMILY MEDICINE

## 2022-06-27 PROCEDURE — 86803 HEPATITIS C AB TEST: CPT | Performed by: FAMILY MEDICINE

## 2022-06-27 PROCEDURE — 3074F SYST BP LT 130 MM HG: CPT | Mod: CPTII,S$GLB,, | Performed by: FAMILY MEDICINE

## 2022-06-27 PROCEDURE — 99999 PR PBB SHADOW E&M-EST. PATIENT-LVL III: CPT | Mod: PBBFAC,,, | Performed by: FAMILY MEDICINE

## 2022-06-27 PROCEDURE — 99999 PR PBB SHADOW E&M-EST. PATIENT-LVL III: ICD-10-PCS | Mod: PBBFAC,,, | Performed by: FAMILY MEDICINE

## 2022-06-27 PROCEDURE — 99396 PR PREVENTIVE VISIT,EST,40-64: ICD-10-PCS | Mod: S$GLB,,, | Performed by: FAMILY MEDICINE

## 2022-06-27 PROCEDURE — 3078F PR MOST RECENT DIASTOLIC BLOOD PRESSURE < 80 MM HG: ICD-10-PCS | Mod: CPTII,S$GLB,, | Performed by: FAMILY MEDICINE

## 2022-06-27 PROCEDURE — 36415 COLL VENOUS BLD VENIPUNCTURE: CPT | Performed by: FAMILY MEDICINE

## 2022-06-27 PROCEDURE — 3074F PR MOST RECENT SYSTOLIC BLOOD PRESSURE < 130 MM HG: ICD-10-PCS | Mod: CPTII,S$GLB,, | Performed by: FAMILY MEDICINE

## 2022-06-27 PROCEDURE — 87389 HIV-1 AG W/HIV-1&-2 AB AG IA: CPT | Performed by: FAMILY MEDICINE

## 2022-06-27 PROCEDURE — 1159F PR MEDICATION LIST DOCUMENTED IN MEDICAL RECORD: ICD-10-PCS | Mod: CPTII,S$GLB,, | Performed by: FAMILY MEDICINE

## 2022-06-27 PROCEDURE — 3078F DIAST BP <80 MM HG: CPT | Mod: CPTII,S$GLB,, | Performed by: FAMILY MEDICINE

## 2022-06-27 PROCEDURE — 1159F MED LIST DOCD IN RCRD: CPT | Mod: CPTII,S$GLB,, | Performed by: FAMILY MEDICINE

## 2022-06-27 PROCEDURE — 99396 PREV VISIT EST AGE 40-64: CPT | Mod: S$GLB,,, | Performed by: FAMILY MEDICINE

## 2022-06-27 PROCEDURE — 83036 HEMOGLOBIN GLYCOSYLATED A1C: CPT | Performed by: FAMILY MEDICINE

## 2022-06-27 PROCEDURE — 80061 LIPID PANEL: CPT | Performed by: FAMILY MEDICINE

## 2022-06-27 RX ORDER — BUPROPION HYDROCHLORIDE 150 MG/1
150 TABLET ORAL DAILY
Qty: 90 TABLET | Refills: 3 | Status: SHIPPED | OUTPATIENT
Start: 2022-06-27 | End: 2023-10-19 | Stop reason: SDUPTHER

## 2022-06-27 RX ORDER — HYOSCYAMINE SULFATE 0.38 MG/1
0.38 TABLET, EXTENDED RELEASE ORAL EVERY 12 HOURS PRN
Qty: 180 TABLET | Refills: 3 | Status: SHIPPED | OUTPATIENT
Start: 2022-06-27 | End: 2022-06-28 | Stop reason: SDUPTHER

## 2022-06-27 NOTE — PROGRESS NOTES
Subjective:       Patient ID: Vera Greer is a 56 y.o. female.    Chief Complaint: Annual Exam    HPI Mood doing well and wants to cont wellbutrin;tried getting off and after a month husb nticed so resumed    hypothy off med and nl tsh 4/20 outside dr;also nl cmp chol 239 ldl 174 a1c 5.7 tg 122     infreq migraine hx rf fiornal     elev chol : prev onstatin  Resumed lipitor via endocrin 2/21 chol 265 down to 130s;now off statin    V    Saw endocrine 2/21 dr rachel streeter ([facility]; b/c chol was high off statin  Now on. And a1c was bit high so rybelsus started.lost 40lbs and has been off several months and about 10lbs regain.just learned that the clinic is closed. She is deferring new endocrine.      [facility] derm/hair  specialist recently in noted elevated ferritin mid 200s saw on our hematologist virtual visit and recheck ferritin normal level and 1 year follow-up option given but felt no need follow-up actually needed    Twice last couple months has noticed bounding heart rate not actually irregular or as necessary earlier and she has been under more stress with her 's parents knee wound [facility] etc she sees her doctor tomorrow at the onset of visit she felt this sensation and I felt her pulse and was very normal    Past Medical History:   Diagnosis Date    Anxiety     Chronic edema     aldactone and for hirsutism?    Depression     GERD (gastroesophageal reflux disease)     Hyperlipidemia     Hypothyroid     Irritable bowel syndrome     Sleep-related bruxism     chronic benzo x years     Past Surgical History:   Procedure Laterality Date    ADENOIDECTOMY      APPENDECTOMY      BELT ABDOMINOPLASTY      BREAST SURGERY      reduction     CHOLECYSTECTOMY      COLONOSCOPY N/A 4/27/2016    Procedure: COLONOSCOPY;  Surgeon: Lisbeth Ruth MD;  Location: [facility];  Service: Endoscopy;  Laterality: N/A;    HYSTERECTOMY      partial     TONSILLECTOMY        Family History   Problem Relation Age of Onset    Arthritis Mother     Heart disease Father     Hypertension Father     Colon cancer Neg Hx      Social History     Socioeconomic History    Marital status:      Spouse name: ROOSEVELT    Number of children: 4    Years of education: Not on file    Highest education level: Not on file   Occupational History    Not on file   Tobacco Use    Smoking status: Never Smoker    Smokeless tobacco: Never Used   Substance and Sexual Activity    Alcohol use: Yes     Alcohol/week: 0.0 standard drinks     Comment: once a year     Drug use: No    Sexual activity: Yes     Partners: Male     Birth control/protection: Surgical   Other Topics Concern    Are you pregnant or think you may be? No    Breast-feeding No   Social History Narrative         Social Determinants of Health     Financial Resource Strain: Low Risk     Difficulty of Paying Living Expenses: Not hard at all   Food Insecurity: No Food Insecurity    Worried About Running Out of Food in the Last Year: Never true    Ran Out of Food in the Last Year: Never true   Transportation Needs: No Transportation Needs    Lack of Transportation (Medical): No    Lack of Transportation (Non-Medical): No   Physical Activity: Insufficiently Active    Days of Exercise per Week: 2 days    Minutes of Exercise per Session: 40 min   Stress: No Stress Concern Present    Feeling of Stress : Only a little   Social Connections: Unknown    Frequency of Communication with Friends and Family: Three times a week    Frequency of Social Gatherings with Friends and Family: Once a week    Attends Sikhism Services: Not on file    Active Member of Clubs or Organizations: Yes    Attends Club or Organization Meetings: More than 4 times per year    Marital Status:          Review of Systems Cardiovascular: no chest pain  Chest: no shortness of breath  Abd: no abd pain  Remainder review of systems negative         Objective:      Physical Exam  Vitals and nursing note reviewed.   Constitutional:       General: She is not in acute distress.     Appearance: She is well-developed.   HENT:      Head: Atraumatic.      Right Ear: External ear normal.      Left Ear: External ear normal.      Nose: Nose normal.      Mouth/Throat:      Pharynx: No oropharyngeal exudate.   Eyes:      General: No scleral icterus.     Conjunctiva/sclera: Conjunctivae normal.      Pupils: Pupils are equal, round, and reactive to light.   Neck:      Thyroid: No thyromegaly.   Cardiovascular:      Rate and Rhythm: Normal rate and regular rhythm.      Heart sounds: Normal heart sounds. No murmur heard.     Pulmonary:      Effort: Pulmonary effort is normal. No respiratory distress.      Breath sounds: Normal breath sounds. No wheezing or rales.   Abdominal:      General: Bowel sounds are normal. There is no distension.      Palpations: Abdomen is soft. There is no mass.      Tenderness: There is no abdominal tenderness. There is no guarding or rebound.   Musculoskeletal:         General: No tenderness. Normal range of motion.      Cervical back: Normal range of motion and neck supple.   Lymphadenopathy:      Cervical: No cervical adenopathy.   Skin:     General: Skin is warm.      Coloration: Skin is not pale.      Findings: No erythema or rash.   Neurological:      Mental Status: She is alert and oriented to person, place, and time.      Cranial Nerves: No cranial nerve deficit.      Motor: No abnormal muscle tone.      Coordination: Coordination normal.   Psychiatric:         Behavior: Behavior normal.         Thought Content: Thought content normal.         Judgment: Judgment normal.         Assessment:       1. Routine health maintenance              vol wt loss stable  hyperchol  Osteopenia  igt  Plan:       *note : had nl dexa via endocrine**  ;; 3/21 mild femoral neck ostepenia but frax lowenough no medsneeded      F/u 6 vs 12months dependng on lab.  If a1c very nl (months off rebylsus) then cmp, tsh lipid a1c one year. If a1c elev then 6 months and f/u  She will notify if new endocrine desired. Will hold off fo rnow    Routine health maintenance    Depression, unspecified depression type  -     buPROPion (WELLBUTRIN XL) 150 MG TB24 tablet; Take 1 tablet (150 mg total) by mouth once daily.  Dispense: 90 tablet; Refill: 3    Irritable bowel syndrome without diarrhea  -     hyoscyamine (LEVBID) 0.375 mg Tb12; Take 1 tablet (0.375 mg total) by mouth every 12 (twelve) hours as needed.  Dispense: 180 tablet; Refill: 3    Insulin resistance  -     Hemoglobin A1C; Future; Expected date: 06/27/2022  -     Lipid Panel; Future; Expected date: 06/27/2022    Encounter for screening for human immunodeficiency virus (HIV)  -     HIV 1/2 Ag/Ab (4th Gen); Future; Expected date: 06/27/2022    Need for hepatitis C screening test  -     Hepatitis C Antibody; Future; Expected date: 06/27/2022

## 2022-06-28 LAB
HCV AB SERPL QL IA: NEGATIVE
HIV 1+2 AB+HIV1 P24 AG SERPL QL IA: NEGATIVE

## 2022-07-12 ENCOUNTER — PATIENT OUTREACH (OUTPATIENT)
Dept: ADMINISTRATIVE | Facility: HOSPITAL | Age: 57
End: 2022-07-12
Payer: COMMERCIAL

## 2022-12-30 DIAGNOSIS — E88.819 INSULIN RESISTANCE: Primary | ICD-10-CM

## 2022-12-30 DIAGNOSIS — E78.5 HYPERLIPIDEMIA, UNSPECIFIED HYPERLIPIDEMIA TYPE: ICD-10-CM

## 2023-05-08 DIAGNOSIS — K58.9 IRRITABLE BOWEL SYNDROME WITHOUT DIARRHEA: ICD-10-CM

## 2023-05-08 NOTE — TELEPHONE ENCOUNTER
Refill Routing Note   Medication(s) are not appropriate for processing by [facility] for the following reason(s):      Medication outside of protocol    [facility] action(s):  Route None identified          Appointments  past 12m or future 3m with PCP    Date Provider   Last Visit   6/27/2022 Taz Cartagena MD   Next Visit   Visit date not found Taz Cartagena MD   ED visits in past 90 days: 0        Note composed:2:18 PM 05/08/2023

## 2023-05-12 ENCOUNTER — TELEPHONE (OUTPATIENT)
Dept: INTERNAL MEDICINE | Facility: CLINIC | Age: 58
End: 2023-05-12
Payer: COMMERCIAL

## 2023-05-12 RX ORDER — HYOSCYAMINE SULFATE 0.38 MG/1
TABLET, EXTENDED RELEASE ORAL
Refills: 3 | OUTPATIENT
Start: 2023-05-12

## 2023-05-12 NOTE — TELEPHONE ENCOUNTER
I spoke with the pharmacy department stated they wanted to inform  Dr Cartagena that the patient had a refill on the medication Levbid.     Levbid 1.25 mg dispense 360 tabs was refilled on 4/17/23  By Dr Rocky Sherwood

## 2023-05-12 NOTE — TELEPHONE ENCOUNTER
----- Message from Steffanie Francois sent at 5/12/2023 10:29 AM CDT -----  Contact: [phone]  Type:  Pharmacy Calling to Clarify an RX    Name of Caller:Pharmacist  Pharmacy Name:[facility]  Prescription Name:hyoscyamine (LEVBID) 0.375 mg Tb12  What do they need to clarify?:Patient filled another strength (0.125 mg) of this medication by Dr. Tutu Sherwood a 90 day supply.   Best Call Back Number:[phone] Option 2  Additional Information: Reference Number:[id number]

## 2023-07-25 ENCOUNTER — PATIENT MESSAGE (OUTPATIENT)
Dept: INTERNAL MEDICINE | Facility: CLINIC | Age: 58
End: 2023-07-25
Payer: COMMERCIAL

## 2023-07-25 NOTE — TELEPHONE ENCOUNTER
I spoke with the patient stated that her and spouse had a change in their health insurance this has caused them to change primary care doctors. Ms Gamez stated that they will be canceling all there up coming appointments.

## 2023-07-31 ENCOUNTER — OFFICE VISIT (OUTPATIENT)
Dept: SURGERY | Facility: CLINIC | Age: 58
End: 2023-07-31
Payer: COMMERCIAL

## 2023-07-31 VITALS
HEART RATE: 82 BPM | BODY MASS INDEX: 24.27 KG/M2 | DIASTOLIC BLOOD PRESSURE: 82 MMHG | OXYGEN SATURATION: 95 % | SYSTOLIC BLOOD PRESSURE: 123 MMHG | WEIGHT: 164.38 LBS | TEMPERATURE: 98 F

## 2023-07-31 DIAGNOSIS — K64.5 THROMBOSED EXTERNAL HEMORRHOID: Primary | ICD-10-CM

## 2023-07-31 PROCEDURE — 99999 PR PBB SHADOW E&M-EST. PATIENT-LVL IV: CPT | Mod: PBBFAC,,,

## 2023-07-31 PROCEDURE — 1159F MED LIST DOCD IN RCRD: CPT | Mod: CPTII,S$GLB,,

## 2023-07-31 PROCEDURE — 1160F RVW MEDS BY RX/DR IN RCRD: CPT | Mod: CPTII,S$GLB,,

## 2023-07-31 PROCEDURE — 3079F PR MOST RECENT DIASTOLIC BLOOD PRESSURE 80-89 MM HG: ICD-10-PCS | Mod: CPTII,S$GLB,,

## 2023-07-31 PROCEDURE — 99204 OFFICE O/P NEW MOD 45 MIN: CPT | Mod: 25,S$GLB,,

## 2023-07-31 PROCEDURE — 3008F PR BODY MASS INDEX (BMI) DOCUMENTED: ICD-10-PCS | Mod: CPTII,S$GLB,,

## 2023-07-31 PROCEDURE — 3074F SYST BP LT 130 MM HG: CPT | Mod: CPTII,S$GLB,,

## 2023-07-31 PROCEDURE — 3008F BODY MASS INDEX DOCD: CPT | Mod: CPTII,S$GLB,,

## 2023-07-31 PROCEDURE — 1160F PR REVIEW ALL MEDS BY PRESCRIBER/CLIN PHARMACIST DOCUMENTED: ICD-10-PCS | Mod: CPTII,S$GLB,,

## 2023-07-31 PROCEDURE — 46320 REMOVAL OF HEMORRHOID CLOT: CPT | Mod: S$GLB,,,

## 2023-07-31 PROCEDURE — 99999 PR PBB SHADOW E&M-EST. PATIENT-LVL IV: ICD-10-PCS | Mod: PBBFAC,,,

## 2023-07-31 PROCEDURE — 99204 PR OFFICE/OUTPT VISIT, NEW, LEVL IV, 45-59 MIN: ICD-10-PCS | Mod: 25,S$GLB,,

## 2023-07-31 PROCEDURE — 3074F PR MOST RECENT SYSTOLIC BLOOD PRESSURE < 130 MM HG: ICD-10-PCS | Mod: CPTII,S$GLB,,

## 2023-07-31 PROCEDURE — 3079F DIAST BP 80-89 MM HG: CPT | Mod: CPTII,S$GLB,,

## 2023-07-31 PROCEDURE — 46320 PR EXCISION THROMBOSED HEMORRHOID, EXTERNAL: ICD-10-PCS | Mod: S$GLB,,,

## 2023-07-31 PROCEDURE — 1159F PR MEDICATION LIST DOCUMENTED IN MEDICAL RECORD: ICD-10-PCS | Mod: CPTII,S$GLB,,

## 2023-07-31 NOTE — PROGRESS NOTES
History & Physical    SUBJECTIVE:     CC: external thrombosed hemorrhoid    History of Present Illness:  Patient is a 58 y.o. female presents with rectal pain.  Onset of symptoms on Tuesday after hard stool.  Developed a large external hemorrhoid that causes pain with worse with palpation.  Has tried over-the-counter creams with no relief of symptoms.  Pain got worse over the weekend.  Alleviating factors include warm baths, but day.  Denies any previous episode.  Denies any rectal drainage or anal bleeding.  Reports bowel movement every other day, does not take any fiber, stool softeners, laxatives, sits on the toilet for less than 5 minutes at a time, drinks less than 64 oz of water daily, has occasional hard stools and straining.  Last colonoscopy 2016 with internal hemorrhoids only.  Denies any family history of colorectal cancer, IBD, polyps.  No anticoagulation.  Denies any nausea, vomiting, fevers, chills, rectal bleeding, abdominal pain.    Chief Complaint   Patient presents with    Hemorrhoids       Review of patient's allergies indicates:   Allergen Reactions    Augmentin [amoxicillin-pot clavulanate]      Bad yeast infxn. ;she prefers other abx       Current Outpatient Medications   Medication Sig Dispense Refill    buPROPion (WELLBUTRIN XL) 150 MG TB24 tablet Take 1 tablet (150 mg total) by mouth once daily. 90 tablet 3    hyoscyamine (LEVBID) 0.375 mg Tb12 Take 1 tablet (0.375 mg total) by mouth every 12 (twelve) hours as needed. 180 tablet 3    multivitamin capsule Take by mouth. 1 Capsule Oral Every day      omega-3 fatty acids-vitamin E 1,000-5 mg-unit Cap Take by mouth. 1 Capsule Oral Every day      SOY ISOFLAVONES ORAL Take 40 mg by mouth once daily.       No current facility-administered medications for this visit.       Past Medical History:   Diagnosis Date    Anxiety     Cancer     skin/right lower leg    Chronic edema     aldactone and for hirsutism?    Depression     Elevated ferritin      hematol 2022; nl in our range    GERD (gastroesophageal reflux disease)     Hyperlipidemia     Hypothyroid     Irritable bowel syndrome     Osteopenia 03/2021    outside dexa/no meds needed    Sleep-related bruxism     chronic benzo x years     Past Surgical History:   Procedure Laterality Date    ADENOIDECTOMY      APPENDECTOMY      BELT ABDOMINOPLASTY      BREAST SURGERY      reduction     CHOLECYSTECTOMY      COLONOSCOPY N/A 4/27/2016    Procedure: COLONOSCOPY;  Surgeon: Lisbeth Ruth MD;  Location: [facility];  Service: Endoscopy;  Laterality: N/A;    HYSTERECTOMY      partial     TONSILLECTOMY       Family History   Problem Relation Age of Onset    Breast cancer Mother     Arthritis Mother     Heart disease Father     Hypertension Father     Breast cancer Maternal Grandmother     Colon cancer Neg Hx      Social History     Tobacco Use    Smoking status: Never    Smokeless tobacco: Never   Substance Use Topics    Alcohol use: Yes     Alcohol/week: 0.0 standard drinks of alcohol     Comment: once a year     Drug use: No        Review of Systems:  Review of Systems   Constitutional:  Negative for activity change, appetite change, chills, fatigue, fever and unexpected weight change.   HENT:  Negative for congestion, ear pain, sore throat and trouble swallowing.    Eyes:  Negative for pain, redness and itching.   Respiratory:  Negative for cough, shortness of breath and wheezing.    Cardiovascular:  Negative for chest pain, palpitations and leg swelling.   Gastrointestinal:  Positive for rectal pain. Negative for abdominal distention, abdominal pain, anal bleeding, blood in stool, constipation, diarrhea, nausea and vomiting.   Endocrine: Negative for cold intolerance, heat intolerance and polyuria.   Genitourinary:  Negative for dysuria, flank pain, frequency and hematuria.   Musculoskeletal:  Negative for gait problem, joint swelling and neck pain.   Skin:  Negative for color change, rash and wound.    Allergic/Immunologic: Negative for environmental allergies and immunocompromised state.   Neurological:  Negative for dizziness, speech difficulty, weakness and numbness.   Psychiatric/Behavioral:  Negative for agitation, confusion and hallucinations.        OBJECTIVE:     Vital Signs (Most Recent)  Temp: 97.9 °F (36.6 °C) (07/31/23 1011)  Pulse: 82 (07/31/23 1011)  BP: 123/82 (07/31/23 1011)  SpO2: 95 % (07/31/23 1011)     74.6 kg (164 lb 5.7 oz)     Physical Exam:  Physical Exam  Vitals reviewed. Exam conducted with a chaperone present.   Constitutional:       General: She is not in acute distress.     Appearance: Normal appearance. She is well-developed and normal weight. She is not ill-appearing or toxic-appearing.   HENT:      Head: Normocephalic and atraumatic.      Right Ear: External ear normal.      Left Ear: External ear normal.      Nose: Nose normal.   Cardiovascular:      Rate and Rhythm: Normal rate.   Pulmonary:      Effort: Pulmonary effort is normal. No respiratory distress.   Abdominal:      General: Abdomen is flat. There is no distension.      Palpations: Abdomen is soft.      Tenderness: There is no abdominal tenderness.   Genitourinary:     Comments: Anorectal: +large left lateral thrombosed external hemorrhoid, +TTP. No other external masses or lesions. SHAN with good tone, no blood, and no palpable masses or lesions.   Musculoskeletal:         General: Normal range of motion.      Cervical back: Normal range of motion and neck supple.   Skin:     General: Skin is warm and dry.   Neurological:      Mental Status: She is alert and oriented to person, place, and time.   Psychiatric:         Mood and Affect: Mood normal.         Behavior: Behavior normal.       Hemorrhoid Thrombectomy Procedure Note:  Procedure: Patient gave verbal informed consent for excision of thrombosed external hemorrhoid. Patient was seen in the clinic room and placed in the jackknife position on the treatment table.  Attention was directed to the left lateral thrombosed external hemorrhoid. The area was prepped with betadine. Approximately 10cc of 1% lidocaine with epi was injected into the allegra-anal area. Once the area was anesthetized allowing 2-3 minutes for the anesthetic to take effect, I utilized a #15 scalpel to incise the hemorrhoids to evacuate the clot via cruciate incision. Once all of the clot was evacuated, the area was hemostatic and covered with dry gauze. The patient tolerated the procedure well.     Laboratory  Component Ref Range & Units 5 mo ago   Fecal Immunochemical Test (iFOBT) Negative Negative      Diagnostic Results:  Colonoscopy 04/2016 - internal hemorrhoids, repeat recommended 10 years    ASSESSMENT/PLAN:     58 y.o. female with anorectal pain and large thrombosed external hemorrhoid     -bedside thrombectomy today in clinic of external thrombosed hemorrhoid  -recommended local wound care, otc pain medications, warm sitz baths prn   -Discussed that a minimum I would recommend behavioral, lifestyle and medication modifications to improve bowel habits. Usual bowel management handout given to patient. This includes a stool softener twice per day, fiber powder supplementation daily, drinking at least 64oz of water/day, avoiding straining with bowel movements, spending less than 5 min on toilet per bowel movement, eating a high fiber diet, using miralax as needed to achieve a bowel movement daily and using wet wipes to wipe after bowel movements when irritated.   -RTC prn      Rae Auguste PA-C  Colon and Rectal Surgery  [facility]

## 2023-07-31 NOTE — PATIENT INSTRUCTIONS
Care of anal region following excision of thrombosed hemorrhoid     Pain control: The local medication which was used to numb the area will begin to wear off in a few hours. Use extra strength tylenol or prescription medication to control pain. Do not use aspirin for 2 weeks following the procedure, unless ordered by your physician.      Activity: You may return to regular activity over the next several days. Remove the dressing and begin warm soaks in 6-8 hours.     Hygiene: Soak in a warm bath to relieve discomfort and keep area clean. Soak for 10 minutes three times a day and after each bowel movement.      Please call the office if you have:   increasing anal pain not relieved by warm baths and tylenol   excessive bleeding (a cupful or more)  Fever  inability to urinate after 6 hours following office visit    Phone number: [phone]

## 2023-08-03 ENCOUNTER — TELEPHONE (OUTPATIENT)
Dept: SURGERY | Facility: CLINIC | Age: 58
End: 2023-08-03
Payer: COMMERCIAL

## 2023-08-03 NOTE — TELEPHONE ENCOUNTER
Called patient regarding questions that she had from procedure on Monday. States that she is no longer having any pain/pressure from the hemorrhoid. States that she had some bleeding for the first 1-2 days following the procedure but that has stopped. Questioned regarding whether external hemorrhoid tissue will be there permanently. Explained to pain that it should shrink up over time. If not, would be able to have it surgically removed. Told her to give it a couple of weeks to continue with healing & can reevaluate then. Reminded patient of warm sitz baths, fiber supplementation, sitting <5 mins on toilet, drinking >64 oz of water daily. Patient verbalized understanding.     ----- Message from Jennifer Koenig MA sent at 8/3/2023 10:58 AM CDT -----  Regarding: FW: Patient Advice  Contact: Vera  This patient like for you call her because she has some questions for you.       ----- Message -----  From: Kacy Jackson  Sent: 8/3/2023  10:49 AM CDT  To: [facility] Staff  Subject: Patient Advice                                   .Type:  Needs Medical Advice    Who Called: Vera  Symptoms (please be specific):    How long has patient had these symptoms:    Pharmacy name and phone #:    Would the patient rather a call back or a response via [facility]? call  Best Call Back Number: [phone] (home)    Additional Information:  Vera would like to speak to you today in regards to her care.

## 2023-10-02 ENCOUNTER — PATIENT MESSAGE (OUTPATIENT)
Dept: INTERNAL MEDICINE | Facility: CLINIC | Age: 58
End: 2023-10-02
Payer: COMMERCIAL

## 2023-10-02 ENCOUNTER — OFFICE VISIT (OUTPATIENT)
Dept: OPHTHALMOLOGY | Facility: CLINIC | Age: 58
End: 2023-10-02
Payer: COMMERCIAL

## 2023-10-02 DIAGNOSIS — H10.812 PINGUECULITIS OF LEFT EYE: Primary | ICD-10-CM

## 2023-10-02 DIAGNOSIS — H18.49 CORNEAL DELLEN OF LEFT EYE: ICD-10-CM

## 2023-10-02 PROCEDURE — 1159F PR MEDICATION LIST DOCUMENTED IN MEDICAL RECORD: ICD-10-PCS | Mod: CPTII,S$GLB,, | Performed by: OPTOMETRIST

## 2023-10-02 PROCEDURE — 1159F MED LIST DOCD IN RCRD: CPT | Mod: CPTII,S$GLB,, | Performed by: OPTOMETRIST

## 2023-10-02 PROCEDURE — 99999 PR PBB SHADOW E&M-EST. PATIENT-LVL II: CPT | Mod: PBBFAC,,, | Performed by: OPTOMETRIST

## 2023-10-02 PROCEDURE — 99999 PR PBB SHADOW E&M-EST. PATIENT-LVL II: ICD-10-PCS | Mod: PBBFAC,,, | Performed by: OPTOMETRIST

## 2023-10-02 PROCEDURE — 99204 OFFICE O/P NEW MOD 45 MIN: CPT | Mod: S$GLB,,, | Performed by: OPTOMETRIST

## 2023-10-02 PROCEDURE — 99204 PR OFFICE/OUTPT VISIT, NEW, LEVL IV, 45-59 MIN: ICD-10-PCS | Mod: S$GLB,,, | Performed by: OPTOMETRIST

## 2023-10-02 RX ORDER — LOTEPREDNOL ETABONATE 5 MG/ML
1 SUSPENSION/ DROPS OPHTHALMIC 4 TIMES DAILY
Qty: 5 ML | Refills: 1 | Status: SHIPPED | OUTPATIENT
Start: 2023-10-02 | End: 2023-10-09

## 2023-10-02 NOTE — PROGRESS NOTES
HPI     Eye Problem            Comments: Pt states she noticed yesterday morning her left eye was very   red and irritated. Pt states she noticed the corner of her eye was very   red and it seems to have a bump on it.         Last edited by Disha Wadsworth on 10/2/2023 11:11 AM.            Assessment /Plan     For exam results, see Encounter Report.    Pingueculitis of left eye  -     loteprednol (LOTEMAX) 0.5 % ophthalmic suspension; Place 1 drop into the left eye 4 (four) times daily. for 7 days  Dispense: 5 mL; Refill: 1    Corneal dellen of left eye    Start lotemax qid x 7 days with preservative free tears 3-4 times daily     RTC 7 days for f/u with [facility], if improvement in symptoms. Sooner if any changes to vision or worsening symptoms.

## 2023-10-19 ENCOUNTER — OFFICE VISIT (OUTPATIENT)
Dept: INTERNAL MEDICINE | Facility: CLINIC | Age: 58
End: 2023-10-19
Payer: COMMERCIAL

## 2023-10-19 VITALS
RESPIRATION RATE: 18 BRPM | HEART RATE: 69 BPM | SYSTOLIC BLOOD PRESSURE: 132 MMHG | WEIGHT: 166.25 LBS | HEIGHT: 69 IN | TEMPERATURE: 98 F | OXYGEN SATURATION: 97 % | BODY MASS INDEX: 24.62 KG/M2 | DIASTOLIC BLOOD PRESSURE: 68 MMHG

## 2023-10-19 DIAGNOSIS — I25.10 CORONARY ARTERY CALCIFICATION: ICD-10-CM

## 2023-10-19 DIAGNOSIS — I25.84 CORONARY ARTERY CALCIFICATION: ICD-10-CM

## 2023-10-19 DIAGNOSIS — K21.9 GASTROESOPHAGEAL REFLUX DISEASE WITHOUT ESOPHAGITIS: ICD-10-CM

## 2023-10-19 DIAGNOSIS — Z00.00 ROUTINE HEALTH MAINTENANCE: Primary | ICD-10-CM

## 2023-10-19 DIAGNOSIS — R09.89 CHEST CONGESTION: ICD-10-CM

## 2023-10-19 DIAGNOSIS — F32.A DEPRESSION, UNSPECIFIED DEPRESSION TYPE: ICD-10-CM

## 2023-10-19 DIAGNOSIS — E78.5 HYPERLIPIDEMIA, UNSPECIFIED HYPERLIPIDEMIA TYPE: ICD-10-CM

## 2023-10-19 DIAGNOSIS — R09.81 NASAL CONGESTION: ICD-10-CM

## 2023-10-19 DIAGNOSIS — E03.9 HYPOTHYROIDISM, UNSPECIFIED TYPE: ICD-10-CM

## 2023-10-19 DIAGNOSIS — K58.9 IRRITABLE BOWEL SYNDROME WITHOUT DIARRHEA: ICD-10-CM

## 2023-10-19 DIAGNOSIS — R73.03 PREDIABETES: ICD-10-CM

## 2023-10-19 DIAGNOSIS — M85.80 OSTEOPENIA, UNSPECIFIED LOCATION: ICD-10-CM

## 2023-10-19 PROBLEM — M25.611 SHOULDER STIFFNESS, RIGHT: Status: RESOLVED | Noted: 2019-07-23 | Resolved: 2023-10-19

## 2023-10-19 PROBLEM — M25.511 SHOULDER PAIN, RIGHT: Status: RESOLVED | Noted: 2019-07-23 | Resolved: 2023-10-19

## 2023-10-19 PROBLEM — R53.1 WEAKNESS: Status: RESOLVED | Noted: 2019-07-23 | Resolved: 2023-10-19

## 2023-10-19 LAB
CTP QC/QA: YES
CTP QC/QA: YES
POC MOLECULAR INFLUENZA A AGN: NEGATIVE
POC MOLECULAR INFLUENZA B AGN: NEGATIVE
SARS-COV-2 RDRP RESP QL NAA+PROBE: NEGATIVE

## 2023-10-19 PROCEDURE — 87635 SARS-COV-2 COVID-19 AMP PRB: CPT | Mod: QW,S$GLB,, | Performed by: FAMILY MEDICINE

## 2023-10-19 PROCEDURE — 90471 IMMUNIZATION ADMIN: CPT | Mod: S$GLB,,, | Performed by: FAMILY MEDICINE

## 2023-10-19 PROCEDURE — 3008F BODY MASS INDEX DOCD: CPT | Mod: CPTII,S$GLB,, | Performed by: FAMILY MEDICINE

## 2023-10-19 PROCEDURE — 3078F PR MOST RECENT DIASTOLIC BLOOD PRESSURE < 80 MM HG: ICD-10-PCS | Mod: CPTII,S$GLB,, | Performed by: FAMILY MEDICINE

## 2023-10-19 PROCEDURE — 3008F PR BODY MASS INDEX (BMI) DOCUMENTED: ICD-10-PCS | Mod: CPTII,S$GLB,, | Performed by: FAMILY MEDICINE

## 2023-10-19 PROCEDURE — 1159F PR MEDICATION LIST DOCUMENTED IN MEDICAL RECORD: ICD-10-PCS | Mod: CPTII,S$GLB,, | Performed by: FAMILY MEDICINE

## 2023-10-19 PROCEDURE — 87635: ICD-10-PCS | Mod: QW,S$GLB,, | Performed by: FAMILY MEDICINE

## 2023-10-19 PROCEDURE — 99999 PR PBB SHADOW E&M-EST. PATIENT-LVL III: ICD-10-PCS | Mod: PBBFAC,,, | Performed by: FAMILY MEDICINE

## 2023-10-19 PROCEDURE — 99396 PR PREVENTIVE VISIT,EST,40-64: ICD-10-PCS | Mod: 25,S$GLB,, | Performed by: FAMILY MEDICINE

## 2023-10-19 PROCEDURE — 90686 IIV4 VACC NO PRSV 0.5 ML IM: CPT | Mod: S$GLB,,, | Performed by: FAMILY MEDICINE

## 2023-10-19 PROCEDURE — 90471 FLU VACCINE (QUAD) GREATER THAN OR EQUAL TO 3YO PRESERVATIVE FREE IM: ICD-10-PCS | Mod: S$GLB,,, | Performed by: FAMILY MEDICINE

## 2023-10-19 PROCEDURE — 87502 POCT INFLUENZA A/B MOLECULAR: ICD-10-PCS | Mod: QW,S$GLB,, | Performed by: FAMILY MEDICINE

## 2023-10-19 PROCEDURE — 3075F PR MOST RECENT SYSTOLIC BLOOD PRESS GE 130-139MM HG: ICD-10-PCS | Mod: CPTII,S$GLB,, | Performed by: FAMILY MEDICINE

## 2023-10-19 PROCEDURE — 87502 INFLUENZA DNA AMP PROBE: CPT | Mod: QW,S$GLB,, | Performed by: FAMILY MEDICINE

## 2023-10-19 PROCEDURE — 3078F DIAST BP <80 MM HG: CPT | Mod: CPTII,S$GLB,, | Performed by: FAMILY MEDICINE

## 2023-10-19 PROCEDURE — 99396 PREV VISIT EST AGE 40-64: CPT | Mod: 25,S$GLB,, | Performed by: FAMILY MEDICINE

## 2023-10-19 PROCEDURE — 99999 PR PBB SHADOW E&M-EST. PATIENT-LVL III: CPT | Mod: PBBFAC,,, | Performed by: FAMILY MEDICINE

## 2023-10-19 PROCEDURE — 3075F SYST BP GE 130 - 139MM HG: CPT | Mod: CPTII,S$GLB,, | Performed by: FAMILY MEDICINE

## 2023-10-19 PROCEDURE — 1159F MED LIST DOCD IN RCRD: CPT | Mod: CPTII,S$GLB,, | Performed by: FAMILY MEDICINE

## 2023-10-19 PROCEDURE — 90686 FLU VACCINE (QUAD) GREATER THAN OR EQUAL TO 3YO PRESERVATIVE FREE IM: ICD-10-PCS | Mod: S$GLB,,, | Performed by: FAMILY MEDICINE

## 2023-10-19 RX ORDER — ROSUVASTATIN CALCIUM 10 MG/1
TABLET, COATED ORAL
COMMUNITY
Start: 2023-04-05 | End: 2023-10-19 | Stop reason: SDUPTHER

## 2023-10-19 RX ORDER — HYOSCYAMINE SULFATE 0.38 MG/1
0.38 TABLET, EXTENDED RELEASE ORAL EVERY 12 HOURS PRN
Qty: 180 TABLET | Refills: 3 | Status: SHIPPED | OUTPATIENT
Start: 2023-10-19

## 2023-10-19 RX ORDER — BUPROPION HYDROCHLORIDE 150 MG/1
150 TABLET ORAL DAILY
Qty: 90 TABLET | Refills: 3 | Status: SHIPPED | OUTPATIENT
Start: 2023-10-19

## 2023-10-19 RX ORDER — ROSUVASTATIN CALCIUM 10 MG/1
10 TABLET, COATED ORAL DAILY
Qty: 90 TABLET | Refills: 3 | Status: SHIPPED | OUTPATIENT
Start: 2023-10-19

## 2023-10-19 NOTE — PROGRESS NOTES
Subjective:       Patient ID: Vera Greer is a . female.    Chief Complaint: Annual Exam    HPI Mood doing well and wants to cont wellbutrin;tried getting off and after a month husb nticed so resumed    hypothy off med and nl tsh lab outside dr;a    infreq migraine hx rf fiornal     Cor art calc dr eng 2023 visit : crstor      Prediab [id number]. had been on rybl/wt loss via dr garcia. [facility] prev.    *5*Days of nasal congestion postnasal drip cough without fever  .felt some crain on way up to exam room but ok now;  Over-the-counter cold medications d/wd;main concern is that she was around 11 day old within lastweek      Saw and external physician [facility] in March did lots of lab showed normal CMP TSH LDL cholesterol 201 and has since started statin therapy TSH normal A1c 5.7 private prediabetes range she also had a slightly elevated gliadin antibody IgG of 21 upper limit of normal 19 she does have a long history of IBS for which she takes  hyoscyamine discussed possible gluten free diet may the working trial amounts of soda see if this decreases need for medication    Past Medical History:   Diagnosis Date    Anxiety     Cancer     skin/right lower leg    Chronic edema     aldactone and for hirsutism?    Coronary artery calcification     dr eng;score 59 2022;nl tmst    Depression     Elevated ferritin     hematol 2022; nl in our range    GERD (gastroesophageal reflux disease)     Hyperlipidemia     Hypothyroid     Irritable bowel syndrome     Osteopenia 03/2021    outside dexa/no meds needed    Sleep-related bruxism     chronic benzo x years     Past Surgical History:   Procedure Laterality Date    ADENOIDECTOMY      APPENDECTOMY      BELT ABDOMINOPLASTY      BREAST SURGERY      reduction     CHOLECYSTECTOMY      COLONOSCOPY N/A 4/27/2016    Procedure: COLONOSCOPY;  Surgeon: Lisbeth Ruth MD;  [facility];  Service: Endoscopy;  Laterality: N/A;    HYSTERECTOMY       partial     TONSILLECTOMY       Family History   Problem Relation Age of Onset    Breast cancer Mother     Arthritis Mother     Heart disease Father     Hypertension Father     Breast cancer Maternal Grandmother     Colon cancer Neg Hx      Social History     Socioeconomic History    Marital status:      Spouse name: ROOSEVELT    Number of children: 4   Tobacco Use    Smoking status: Never    Smokeless tobacco: Never   Substance and Sexual Activity    Alcohol use: Yes     Alcohol/week: 0.0 standard drinks of alcohol     Comment: once a year     Drug use: No    Sexual activity: Yes     Partners: Male     Birth control/protection: Surgical   Other Topics Concern    Are you pregnant or think you may be? No    Breast-feeding No   Social History Narrative         Social Determinants of Health     Financial Resource Strain: Low Risk  (6/25/2022)    Overall Financial Resource Strain (CARDIA)     Difficulty of Paying Living Expenses: Not hard at all   Food Insecurity: No Food Insecurity (6/25/2022)    Hunger Vital Sign     Worried About Running Out of Food in the Last Year: Never true     Ran Out of Food in the Last Year: Never true   Transportation Needs: No Transportation Needs (6/25/2022)    PRAPARE - Transportation     Lack of Transportation (Medical): No     Lack of Transportation (Non-Medical): No   Physical Activity: Insufficiently Active (6/25/2022)    Exercise Vital Sign     Days of Exercise per Week: 4 days     Minutes of Exercise per Session: 20 min   Stress: No Stress Concern Present (6/25/2022)    [employer] [place] [employer] - Occupational Stress Questionnaire     Feeling of Stress : Only a little   Social Connections: Unknown (6/25/2022)    Social Connection and Isolation Panel [NHANES]     Frequency of Communication with Friends and Family: Three times a week     Frequency of Social Gatherings with Friends and Family: Once a week     Active Member of Clubs or Organizations: Yes      Attends Club or Organization Meetings: More than 4 times per year     Marital Status:    Housing Stability: Low Risk  (6/25/2022)    Housing Stability Vital Sign     Unable to Pay for Housing in the Last Year: No     Number of Places Lived in the Last Year: 1     Unstable Housing in the Last Year: No           Review of Systems Cardiovascular: no chest pain  Chest: no shortness of breath  Abd: no abd pain  Remainder review of systems negative        Objective:      Physical Exam  Vitals and nursing note reviewed.   Constitutional:       General: She is not in acute distress.     Appearance: She is well-developed.   HENT:      Head: Atraumatic.      Right Ear: External ear normal.      Left Ear: External ear normal.      Nose: Nose normal.      Mouth/Throat:      Pharynx: No oropharyngeal exudate.   Eyes:      General: No scleral icterus.     Conjunctiva/sclera: Conjunctivae normal.      Pupils: Pupils are equal, round, and reactive to light.   Neck:      Thyroid: No thyromegaly.   Cardiovascular:      Rate and Rhythm: Normal rate and regular rhythm.      Heart sounds: Normal heart sounds. No murmur heard.     Pulmonary:      Effort: Pulmonary effort is normal. No respiratory distress.      Breath sounds: Normal breath sounds. No wheezing or rales.   Abdominal:      General: Bowel sounds are normal. There is no distension.      Palpations: Abdomen is soft. There is no mass.      Tenderness: There is no abdominal tenderness. There is no guarding or rebound.   Musculoskeletal:         General: No tenderness. Normal range of motion.      Cervical back: Normal range of motion and neck supple.   Lymphadenopathy:      Cervical: No cervical adenopathy.   Skin:     General: Skin is warm.      Coloration: Skin is not pale.      Findings: No erythema or rash.   Neurological:      Mental Status: She is alert and oriented to person, place, and time.      Cranial Nerves: No cranial nerve deficit.      Motor: No abnormal  muscle tone.      Coordination: Coordination normal.   Psychiatric:         Behavior: Behavior normal.         Thought Content: Thought content normal.         Judgment: Judgment normal.         Assessment:       1. Routine health maintenance              hyperchol  Osteopenia  prediab  Plan:     F/u one yr  *note : had nl dexa via endocrine**  ;; 3/21 mild femoral neck ostepenia but frax lowenough no medsneeded    F/u card when due  Flu shot .    Covid and flu test neg; discussed treatment like cold expect other couple days similar symptoms and gradual improvement resolution by day ten or  so    Routine health maintenance    Coronary artery calcification    Hypothyroidism, unspecified type    Hyperlipidemia, unspecified hyperlipidemia type    Gastroesophageal reflux disease without esophagitis    Osteopenia, unspecified location    Prediabetes    Chest congestion  -     POCT COVID-19 Rapid Screening    Depression, unspecified depression type  -     buPROPion (WELLBUTRIN XL) 150 MG TB24 tablet; Take 1 tablet (150 mg total) by mouth once daily.  Dispense: 90 tablet; Refill: 3    Irritable bowel syndrome without diarrhea  -     hyoscyamine (LEVBID) 0.375 mg Tb12; Take 1 tablet (0.375 mg total) by mouth every 12 (twelve) hours as needed.  Dispense: 180 tablet; Refill: 3    Nasal congestion  -     POCT Influenza A/B Molecular    Other orders  -     rosuvastatin (CRESTOR) 10 MG tablet; Take 1 tablet (10 mg total) by mouth once daily.  Dispense: 90 tablet; Refill: 3  -     Influenza - Quadrivalent (PF)

## 2023-10-24 ENCOUNTER — OFFICE VISIT (OUTPATIENT)
Dept: OPHTHALMOLOGY | Facility: CLINIC | Age: 58
End: 2023-10-24
Payer: COMMERCIAL

## 2023-10-24 DIAGNOSIS — H10.812 PINGUECULITIS OF LEFT EYE: Primary | ICD-10-CM

## 2023-10-24 DIAGNOSIS — H18.49 CORNEAL DELLEN OF LEFT EYE: ICD-10-CM

## 2023-10-24 DIAGNOSIS — H16.203 KERATOCONJUNCTIVITIS OF BOTH EYES: ICD-10-CM

## 2023-10-24 DIAGNOSIS — H53.8 BLURRY VISION, BILATERAL: ICD-10-CM

## 2023-10-24 PROCEDURE — 1160F PR REVIEW ALL MEDS BY PRESCRIBER/CLIN PHARMACIST DOCUMENTED: ICD-10-PCS | Mod: CPTII,S$GLB,, | Performed by: STUDENT IN AN ORGANIZED HEALTH CARE EDUCATION/TRAINING PROGRAM

## 2023-10-24 PROCEDURE — 1159F PR MEDICATION LIST DOCUMENTED IN MEDICAL RECORD: ICD-10-PCS | Mod: CPTII,S$GLB,, | Performed by: STUDENT IN AN ORGANIZED HEALTH CARE EDUCATION/TRAINING PROGRAM

## 2023-10-24 PROCEDURE — 1159F MED LIST DOCD IN RCRD: CPT | Mod: CPTII,S$GLB,, | Performed by: STUDENT IN AN ORGANIZED HEALTH CARE EDUCATION/TRAINING PROGRAM

## 2023-10-24 PROCEDURE — 99999 PR PBB SHADOW E&M-EST. PATIENT-LVL II: CPT | Mod: PBBFAC,,, | Performed by: STUDENT IN AN ORGANIZED HEALTH CARE EDUCATION/TRAINING PROGRAM

## 2023-10-24 PROCEDURE — 92014 COMPRE OPH EXAM EST PT 1/>: CPT | Mod: S$GLB,,, | Performed by: STUDENT IN AN ORGANIZED HEALTH CARE EDUCATION/TRAINING PROGRAM

## 2023-10-24 PROCEDURE — 99999 PR PBB SHADOW E&M-EST. PATIENT-LVL II: ICD-10-PCS | Mod: PBBFAC,,, | Performed by: STUDENT IN AN ORGANIZED HEALTH CARE EDUCATION/TRAINING PROGRAM

## 2023-10-24 PROCEDURE — 1160F RVW MEDS BY RX/DR IN RCRD: CPT | Mod: CPTII,S$GLB,, | Performed by: STUDENT IN AN ORGANIZED HEALTH CARE EDUCATION/TRAINING PROGRAM

## 2023-10-24 PROCEDURE — 92014 PR EYE EXAM, EST PATIENT,COMPREHESV: ICD-10-PCS | Mod: S$GLB,,, | Performed by: STUDENT IN AN ORGANIZED HEALTH CARE EDUCATION/TRAINING PROGRAM

## 2023-10-24 NOTE — PROGRESS NOTES
HPI     Follow-up     Additional comments: Pt states va is stable. Pt denies any pain or   irritation.          Last edited by Disha Wadsworth on 10/24/2023 10:20 AM.            Assessment /Plan     For exam results, see Encounter Report.    Pingueculitis of left eye    Corneal dellen of left eye    Blurry vision, bilateral    Keratoconjunctivitis of both eyes      ATs QID and lid hygiene w/ baby shampoo  Free Soil 3 Fish Oils  Not interested in glasses at this time  Rtc 1 year with Dr. Toscano

## 2023-11-17 ENCOUNTER — PATIENT MESSAGE (OUTPATIENT)
Dept: INTERNAL MEDICINE | Facility: CLINIC | Age: 58
End: 2023-11-17
Payer: COMMERCIAL

## 2023-11-18 ENCOUNTER — OFFICE VISIT (OUTPATIENT)
Dept: URGENT CARE | Facility: CLINIC | Age: 58
End: 2023-11-18
Payer: COMMERCIAL

## 2023-11-18 ENCOUNTER — NURSE TRIAGE (OUTPATIENT)
Dept: ADMINISTRATIVE | Facility: CLINIC | Age: 58
End: 2023-11-18
Payer: COMMERCIAL

## 2023-11-18 VITALS
TEMPERATURE: 100 F | BODY MASS INDEX: 25.38 KG/M2 | DIASTOLIC BLOOD PRESSURE: 67 MMHG | WEIGHT: 167.44 LBS | RESPIRATION RATE: 20 BRPM | OXYGEN SATURATION: 96 % | SYSTOLIC BLOOD PRESSURE: 125 MMHG | HEIGHT: 68 IN | HEART RATE: 88 BPM

## 2023-11-18 DIAGNOSIS — J02.9 ACUTE VIRAL PHARYNGITIS: ICD-10-CM

## 2023-11-18 DIAGNOSIS — B33.8 RSV INFECTION: Primary | ICD-10-CM

## 2023-11-18 DIAGNOSIS — R05.1 ACUTE COUGH: ICD-10-CM

## 2023-11-18 DIAGNOSIS — Z20.828 EXPOSURE TO RESPIRATORY SYNCYTIAL VIRUS (RSV): ICD-10-CM

## 2023-11-18 DIAGNOSIS — R49.0 HOARSE VOICE QUALITY: ICD-10-CM

## 2023-11-18 LAB
CTP QC/QA: YES
POC RSV RAPID ANT MOLECULAR: POSITIVE

## 2023-11-18 PROCEDURE — 99203 PR OFFICE/OUTPT VISIT, NEW, LEVL III, 30-44 MIN: ICD-10-PCS | Mod: S$GLB,,,

## 2023-11-18 PROCEDURE — 87634 RSV DNA/RNA AMP PROBE: CPT | Mod: QW,S$GLB,,

## 2023-11-18 PROCEDURE — 87634 POCT RESPIRATORY SYNCYTIAL VIRUS BY MOLECULAR: ICD-10-PCS | Mod: QW,S$GLB,,

## 2023-11-18 PROCEDURE — 99203 OFFICE O/P NEW LOW 30 MIN: CPT | Mod: S$GLB,,,

## 2023-11-18 RX ORDER — BENZONATATE 200 MG/1
200 CAPSULE ORAL 3 TIMES DAILY PRN
Qty: 21 CAPSULE | Refills: 0 | Status: SHIPPED | OUTPATIENT
Start: 2023-11-18 | End: 2023-11-25

## 2023-11-18 RX ORDER — VITAMIN B COMPLEX
1 CAPSULE ORAL DAILY
COMMUNITY

## 2023-11-18 RX ORDER — MELATONIN 3 MG
10 TABLET ORAL
COMMUNITY

## 2023-11-18 RX ORDER — ASPIRIN 325 MG
50 TABLET, DELAYED RELEASE (ENTERIC COATED) ORAL
COMMUNITY

## 2023-11-18 NOTE — TELEPHONE ENCOUNTER
"Reason for Disposition   Diabetes mellitus or weak immune system (e.g., HIV positive, cancer chemo, splenectomy, organ transplant, chronic steroids)    Additional Information   Negative: SEVERE difficulty breathing (e.g., struggling for each breath, speaks in single words, stridor)   Negative: Sounds like a life-threatening emergency to the triager   Negative: [1] Drooling or spitting out saliva (because can't swallow) AND [2] normal breathing   Negative: Unable to open mouth completely   Negative: [1] Difficulty breathing AND [2] not severe   Negative: Fever > 104 F (40 C)   Negative: [1] Refuses to drink anything AND [2] for > 12 hours   Negative: [1] Drinking very little AND [2] dehydration suspected (e.g., no urine > 12 hours, very dry mouth, very lightheaded)   Negative: Patient sounds very sick or weak to the triager   Negative: SEVERE (e.g., excruciating) throat pain   Negative: [1] Pus on tonsils (back of throat) AND [2]  fever AND [3] swollen neck lymph nodes ("glands")   Negative: [1] Rash AND [2] widespread (especially chest and abdomen)   Negative: Earache also present   Negative: Fever present > 3 days (72 hours)    Protocols used: Sore Throat-A-AH  Pt states she was exposed to RSV. States she has a sore throat, cough, and temp of 99. Pt advised per Triage protocol to see a Health Care Provider within 24 hrs. Pt asked if Urgent Care can test for RSV. Advised caller I do not have that information. Instructed to call OOC back if symptoms worsen. Pt verbalized understanding and states she will go to an Urgent Care near her today.  "

## 2023-11-18 NOTE — PATIENT INSTRUCTIONS
PLEASE READ YOUR DISCHARGE INSTRUCTIONS ENTIRELY AS IT CONTAINS IMPORTANT INFORMATION.      - Please drink plenty of fluids.  - Please get plenty of rest.  - You can take plain Mucinex (guaifenesin) 1200 mg twice a day to help loosen mucous.   - Use over the counter Flonase as directed  Please return here or go to the Emergency Department for any concerns or worsening of condition.  - Please take an over the counter antihistamine medication (Allegra/Claritin/Zyrtec/Xyzal) of your choice as directed. These are antihistamines that can help with runny nose, nasal congestion, sneezing, and helps to dry up post-nasal drip, which usually causes sore throat and cough.    -If you do NOT have high blood pressure, you may use a decongestant form (D)  of this medication (ie. Claritin- D, zyrtec-D, allegra-D, Mucinex-D) or if you do not take the D form, you can take sudafed (pseudoephedrine) over the counter, which is a decongestant. Do NOT take two decongestant (D) medications at the same time (such as mucinex-D and claritin-D or plain sudafed and claritin D). Dextromethorphan (DM) is a cough suppressant over the counter (ie. mucinex DM, robitussin, delsym; dayquil/nyquil has DM as well.)    If you do have Hypertension or palpitations, it is safe to take Coricidin HBP for relief of sinus symptoms.    - If not allergic, please take over the counter Tylenol (Acetaminophen) and/or Motrin (Ibuprofen) as directed for control of pain and/or fever.  Avoid tylenol if you have a history of liver disease. Do not take ibuprofen if you have a history of GI bleeding, kidney disease, or if you take blood thinners.  Please follow up with your primary care doctor or specialist as needed.    -IF YOU RECEIVED PRESCRIPTION COUGH SUPPRESSANTS: Take prescription cough meds (pills) as prescribed; take prescription cough syrup at night as needed for cough.  Do not take both the prescribed cough pills and syrup at the same time or within 6 hours of  each other.  Do not take the cough syrup with any other sedative medication as it can can cause drowsiness. Do not operate any heavy machinery, drink or drive while taking the cough syrup.    Try taking half a dose first of the cough syrup to see how it affects you.     Sore throat recommendations: Warm fluids, warm salt water gargles, throat lozenges, tea, honey, soup, rest, hydration.    Use over the counter flonase: one spray each nostril twice daily OR two sprays each nostril once daily for sinus congestion and postnasal drip. This is a steroid nasal spray that works locally over time to decrease the inflammation in your nose/sinuses and help with allergic symptoms. This is not an quick- relief spray like afrin, but it works well if used daily.  Discontinue if you develop nose bleed    Sinus rinses DO NOT USE TAP WATER, if you must, water must be a rolling boil for 1 minute, let it cool, then use.  May use distilled water, or over the counter nasal saline rinses.  Vics vapor rub in shower to help open nasal passages.  May use nasal gel to keep passages moisturized.  May use Nasal saline sprays during the day for added relief of congestion.   For those who go to the gym, please do not use the sauna or steam room now to clear sinuses.    If you  smoke, please stop smoking.    Please return or see your primary care doctor if you develop new or worsening symptoms.     Please arrange follow up with your primary medical clinic as soon as possible. You must understand that you've received an Urgent Care treatment only and that you may be released before all of your medical problems are known or treated. You, the patient, will arrange for follow up as instructed. If your symptoms worsen or fail to improve you should go to the Emergency Room.

## 2023-11-18 NOTE — PROGRESS NOTES
"Subjective:      Patient ID: Vera Greer is a 58 y.o. female.    Vitals:  height is 5' 8.43" (1.738 m) and weight is 76 kg (167 lb 7 oz). Her oral temperature is 99.6 °F (37.6 °C). Her blood pressure is 125/67 and her pulse is 88. Her respiration is 20 and oxygen saturation is 96%.     Chief Complaint: Sore Throat    59 yo female Pt is here today with a sore throat with hoarse voice and body aches x 4 days and a cough since yesterday. She also is running a low grade fever. She states she was exposed to RSV through her son. She rates her body aches 2/10 and has taken Tylenol and OTC allergy relief medication with moderate relief. States she came to find out if she was contagious with the holidays rolling around. Denies chest pain, shortness of breath, trouble swallowing, ear pain, runny nose , nasal congestion. Allergic to augmentin    Sore Throat   This is a new problem. The current episode started in the past 7 days. The problem has been gradually worsening. Neither side of throat is experiencing more pain than the other. There has been no fever. The pain is at a severity of 2/10. The pain is mild. Associated symptoms include coughing, a hoarse voice and swollen glands. Pertinent negatives include no abdominal pain, congestion, diarrhea, drooling, ear discharge, ear pain, plugged ear sensation, neck pain, shortness of breath, stridor, trouble swallowing or vomiting. She has had no exposure to strep or mono. She has tried acetaminophen (OTC allergy relief) for the symptoms. The treatment provided moderate relief.       Constitution: Positive for fever (low grade).   HENT:  Positive for sore throat and voice change (hoarse). Negative for ear pain, ear discharge, drooling, congestion and trouble swallowing.    Neck: Negative for neck pain and neck stiffness.   Cardiovascular:  Negative for chest pain.   Eyes:  Negative for eye discharge and eye redness.   Respiratory:  Positive for cough. Negative for sputum " production, shortness of breath and stridor.    Gastrointestinal:  Negative for abdominal pain, vomiting and diarrhea.   Musculoskeletal:  Positive for muscle ache (general).   Allergic/Immunologic: Negative for itching.      Objective:     Vitals:    11/18/23 0900   BP: 125/67   Pulse: 88   Resp: 20   Temp: 99.6 °F (37.6 °C)       Physical Exam   Constitutional: She is oriented to person, place, and time. She appears well-developed. She is cooperative.  Non-toxic appearance. She does not appear ill. No distress. awake  HENT:   Head: Normocephalic and atraumatic.   Ears:   Right Ear: Hearing, tympanic membrane, external ear and ear canal normal. impacted cerumen  Left Ear: Hearing, tympanic membrane, external ear and ear canal normal. impacted cerumen  Nose: Nose normal. No mucosal edema, rhinorrhea or nasal deformity. No epistaxis. Right sinus exhibits no maxillary sinus tenderness and no frontal sinus tenderness. Left sinus exhibits no maxillary sinus tenderness and no frontal sinus tenderness.   Mouth/Throat: Uvula is midline, oropharynx is clear and moist and mucous membranes are normal. Mucous membranes are moist. No trismus in the jaw. Normal dentition. No uvula swelling. No oropharyngeal exudate, posterior oropharyngeal edema, posterior oropharyngeal erythema or cobblestoning. No tonsillar exudate.   Eyes: Conjunctivae and lids are normal. Pupils are equal, round, and reactive to light. Right eye exhibits no discharge. Left eye exhibits no discharge. No scleral icterus.   Neck: Trachea normal and phonation normal. Neck supple. No edema present. No erythema present. No neck rigidity present.   Cardiovascular: Normal rate, regular rhythm, normal heart sounds and normal pulses.   Pulmonary/Chest: Effort normal and breath sounds normal. No accessory muscle usage or stridor. No respiratory distress. She has no decreased breath sounds. She has no wheezes. She has no rhonchi. She has no rales.   Abdominal: Normal  appearance.   Musculoskeletal: Normal range of motion.         General: No deformity. Normal range of motion.   Neurological: She is alert and oriented to person, place, and time. She exhibits normal muscle tone. Coordination and gait normal.   Skin: Skin is warm, dry, intact, not diaphoretic, not pale and no rash.   Psychiatric: Her speech is normal and behavior is normal. Judgment and thought content normal.   Nursing note and vitals reviewed.      Assessment:     1. RSV infection    2. Exposure to respiratory syncytial virus (RSV)    3. Hoarse voice quality    4. Acute viral pharyngitis    5. Acute cough      Results for orders placed or performed in visit on 11/18/23   POCT RSV by Molecular   Result Value Ref Range    POC RSV Rapid Ant Molecular Positive (A) Negative     Acceptable Yes          Plan:       RSV infection    Exposure to respiratory syncytial virus (RSV)  -     POCT RSV by Molecular    Hoarse voice quality    Acute viral pharyngitis    Acute cough  -     benzonatate (TESSALON) 200 MG capsule; Take 1 capsule (200 mg total) by mouth 3 (three) times daily as needed for Cough.  Dispense: 21 capsule; Refill: 0      Patient Instructions   PLEASE READ YOUR DISCHARGE INSTRUCTIONS ENTIRELY AS IT CONTAINS IMPORTANT INFORMATION.      - Please drink plenty of fluids.  - Please get plenty of rest.  - You can take plain Mucinex (guaifenesin) 1200 mg twice a day to help loosen mucous.   - Use over the counter Flonase as directed  Please return here or go to the Emergency Department for any concerns or worsening of condition.  - Please take an over the counter antihistamine medication (Allegra/Claritin/Zyrtec/Xyzal) of your choice as directed. These are antihistamines that can help with runny nose, nasal congestion, sneezing, and helps to dry up post-nasal drip, which usually causes sore throat and cough.    -If you do NOT have high blood pressure, you may use a decongestant form (D)  of this  medication (ie. Claritin- D, zyrtec-D, allegra-D, Mucinex-D) or if you do not take the D form, you can take sudafed (pseudoephedrine) over the counter, which is a decongestant. Do NOT take two decongestant (D) medications at the same time (such as mucinex-D and claritin-D or plain sudafed and claritin D). Dextromethorphan (DM) is a cough suppressant over the counter (ie. mucinex DM, robitussin, delsym; dayquil/nyquil has DM as well.)    If you do have Hypertension or palpitations, it is safe to take Coricidin HBP for relief of sinus symptoms.    - If not allergic, please take over the counter Tylenol (Acetaminophen) and/or Motrin (Ibuprofen) as directed for control of pain and/or fever.  Avoid tylenol if you have a history of liver disease. Do not take ibuprofen if you have a history of GI bleeding, kidney disease, or if you take blood thinners.  Please follow up with your primary care doctor or specialist as needed.    -IF YOU RECEIVED PRESCRIPTION COUGH SUPPRESSANTS: Take prescription cough meds (pills) as prescribed; take prescription cough syrup at night as needed for cough.  Do not take both the prescribed cough pills and syrup at the same time or within 6 hours of each other.  Do not take the cough syrup with any other sedative medication as it can can cause drowsiness. Do not operate any heavy machinery, drink or drive while taking the cough syrup.    Try taking half a dose first of the cough syrup to see how it affects you.     Sore throat recommendations: Warm fluids, warm salt water gargles, throat lozenges, tea, honey, soup, rest, hydration.    Use over the counter flonase: one spray each nostril twice daily OR two sprays each nostril once daily for sinus congestion and postnasal drip. This is a steroid nasal spray that works locally over time to decrease the inflammation in your nose/sinuses and help with allergic symptoms. This is not an quick- relief spray like afrin, but it works well if used daily.   Discontinue if you develop nose bleed    Sinus rinses DO NOT USE TAP WATER, if you must, water must be a rolling boil for 1 minute, let it cool, then use.  May use distilled water, or over the counter nasal saline rinses.  Vics vapor rub in shower to help open nasal passages.  May use nasal gel to keep passages moisturized.  May use Nasal saline sprays during the day for added relief of congestion.   For those who go to the gym, please do not use the sauna or steam room now to clear sinuses.    If you  smoke, please stop smoking.    Please return or see your primary care doctor if you develop new or worsening symptoms.     Please arrange follow up with your primary medical clinic as soon as possible. You must understand that you've received an Urgent Care treatment only and that you may be released before all of your medical problems are known or treated. You, the patient, will arrange for follow up as instructed. If your symptoms worsen or fail to improve you should go to the Emergency Room.          Medical Decision Making:   History:   Old Medical Records: I decided to obtain old medical records.  Clinical Tests:   Lab Tests: Ordered and Reviewed       <> Summary of Lab: RSV +  Urgent Care Management:  Reviewed positive RSV test with patient who verbalized understanding.  Advised patient that symptoms are indicative of a viral infection and should be treated symptomatically.  We discussed over-the-counter medications as well as home remedies to help with current symptoms.  We also discussed a wait and see antibiotic plan which the patient verbalized understanding.  Patient educational handouts also included in discharge paperwork for patient who verbalized understanding agrees with plan of care.  They deny any further questions or concerns at this time.  Patient exits exam room in no acute distress.

## 2023-11-20 ENCOUNTER — TELEPHONE (OUTPATIENT)
Dept: URGENT CARE | Facility: CLINIC | Age: 58
End: 2023-11-20
Payer: COMMERCIAL

## 2023-11-20 NOTE — TELEPHONE ENCOUNTER
Spoke with patient. States she has been diagnosed with RSV. Seen at [facility] urgent [facility]. Per pt she was prescribed cough medication and told that virus will have to run its course. CLAYTON.//[email or web address]

## 2023-11-20 NOTE — TELEPHONE ENCOUNTER
Spoke with patient and she is still running a fever. I advised to call or come back in if she needs anything.

## 2024-02-28 DIAGNOSIS — R73.03 PREDIABETES: ICD-10-CM

## 2024-06-01 NOTE — PROGRESS NOTES
Subjective:       Patient ID: Vera Greer is a 54 y.o. female.    Chief Complaint: Sore Throat and Cough  Patient started with sore throat, runny nose 6 days ago   Now has HA, cough that started yest       Sore Throat    This is a new problem. Episode onset: 6 days ago  Associated symptoms include coughing, headaches and a hoarse voice. Pertinent negatives include no abdominal pain, ear pain, plugged ear sensation, neck pain, shortness of breath, stridor, swollen glands, trouble swallowing or vomiting.   Cough   Associated symptoms include headaches and a sore throat. Pertinent negatives include no chest pain, ear pain, fever, rash or shortness of breath.       There are no preventive care reminders to display for this patient.    Past Medical History:   Diagnosis Date    Anxiety     Chronic edema     aldactone and for hirsutism?    Depression     GERD (gastroesophageal reflux disease)     Hyperlipidemia     Hypothyroid     Irritable bowel syndrome     Sleep-related bruxism     chronic benzo x years       Current Outpatient Medications   Medication Sig Dispense Refill    buPROPion (WELLBUTRIN XL) 150 MG TB24 tablet Take 1 tablet (150 mg total) by mouth once daily. 90 tablet 3    CALCIUM/MAGNESIUM/VIT D3 (HEALTHY BONE FORMULA ORAL) Take by mouth.      DHA/EPA/MV/DNG JUANI/HRB42 (PMS WOMENS FORMULA ORAL) Take by mouth. phytoestrogen      ergocalciferol (ERGOCALCIFEROL) 50,000 unit Cap Take 50,000 Units by mouth every 7 days.      hyoscyamine (LEVBID) 0.375 mg Tb12 Take 1 tablet (0.375 mg total) by mouth every 12 (twelve) hours as needed. 180 tablet 3    multivitamin capsule Take by mouth. 1 Capsule Oral Every day      omega-3 fatty acids-vitamin E (OMEGA-3 FISH OIL) 1,000-5 mg-unit Cap Take by mouth. 1 Capsule Oral Every day      omeprazole (PRILOSEC) 20 MG capsule Take 20 mg by mouth once a week.      SOY ISOFLAVONES ORAL Take 40 mg by mouth once daily.      spironolactone (ALDACTONE) 100 MG  "tablet Take 1 tablet (100 mg total) by mouth once daily. 90 tablet 3    UNABLE TO FIND biote hormone pellets      albuterol (VENTOLIN HFA) 90 mcg/actuation inhaler Inhale 2 puffs into the lungs every 6 (six) hours as needed for Wheezing. Rescue 18 g 0    cephALEXin (KEFLEX) 500 MG capsule Take 1 capsule (500 mg total) by mouth every 12 (twelve) hours. for 10 days 20 capsule 0    guaifenesin-codeine 100-10 mg/5 ml (CHERATUSSIN AC)  mg/5 mL syrup Take 5 mLs by mouth 3 (three) times daily as needed for Cough. 118 mL 0     No current facility-administered medications for this visit.        Review of Systems   Constitutional: Negative for fatigue, fever and unexpected weight change.   HENT: Positive for hoarse voice and sore throat. Negative for ear pain and trouble swallowing.    Respiratory: Positive for cough. Negative for shortness of breath and stridor.    Cardiovascular: Negative for chest pain.   Gastrointestinal: Negative for abdominal pain and vomiting.   Musculoskeletal: Negative for neck pain.   Skin: Negative for color change, rash and wound.   Neurological: Positive for headaches.   Hematological: Negative for adenopathy. Does not bruise/bleed easily.   Psychiatric/Behavioral: Negative for sleep disturbance and suicidal ideas.       Objective:   /80   Pulse 92   Temp 99.5 °F (37.5 °C) (Oral)   Ht 5' 9" (1.753 m)   Wt 85.7 kg (188 lb 15 oz)   BMI 27.90 kg/m²      Physical Exam   Constitutional: She is oriented to person, place, and time. She appears well-developed and well-nourished. No distress.   HENT:   Head: Normocephalic and atraumatic.   Right Ear: External ear normal.   Left Ear: External ear normal.   Nose: Nose normal.   Mouth/Throat: Oropharynx is clear and moist.   PND    Eyes: Conjunctivae and EOM are normal. Pupils are equal, round, and reactive to light.   Neck: Normal range of motion. Neck supple.   Cardiovascular: Normal rate, regular rhythm, normal heart sounds and intact " distal pulses.   Pulmonary/Chest: Effort normal and breath sounds normal.   Neurological: She is alert and oriented to person, place, and time.   Skin: Capillary refill takes less than 2 seconds.   Psychiatric: She has a normal mood and affect. Her behavior is normal. Judgment and thought content normal.         Lab Results   Component Value Date    WBC 16.02 (H) 07/06/2018    HGB 15.4 07/06/2018    HCT 44.9 07/06/2018     07/06/2018    CHOL 256 (H) 06/20/2018    TRIG 155 (H) 06/20/2018    HDL 38 (L) 06/20/2018    ALT 29 07/06/2018    AST 20 07/06/2018     07/06/2018    K 3.9 07/06/2018     07/06/2018    CREATININE 0.9 07/06/2018    BUN 9 07/06/2018    CO2 24 07/06/2018    TSH 1.145 06/20/2018       Assessment:       1. Upper respiratory tract infection, unspecified type        Plan:   Upper respiratory tract infection, unspecified type    Other orders  -     albuterol (VENTOLIN HFA) 90 mcg/actuation inhaler; Inhale 2 puffs into the lungs every 6 (six) hours as needed for Wheezing. Rescue  Dispense: 18 g; Refill: 0  -     guaifenesin-codeine 100-10 mg/5 ml (CHERATUSSIN AC)  mg/5 mL syrup; Take 5 mLs by mouth 3 (three) times daily as needed for Cough.  Dispense: 118 mL; Refill: 0  -     cephALEXin (KEFLEX) 500 MG capsule; Take 1 capsule (500 mg total) by mouth every 12 (twelve) hours. for 10 days  Dispense: 20 capsule; Refill: 0           skin normal color for race, warm, dry and intact.

## 2024-09-10 NOTE — TELEPHONE ENCOUNTER
Refill Routing Note   Medication(s) are not appropriate for processing by [facility] for the following reason(s):        Required labs outdated    [facility] action(s):  Defer     Requires labs : Yes             Appointments  past 12m or future 3m with PCP    Date Provider   Last Visit   10/19/2023 Taz Cartagena MD   Next Visit   10/15/2024 Taz Cartagena MD   ED visits in past 90 days: 0        Note composed:11:08 AM 09/10/2024

## 2024-09-10 NOTE — TELEPHONE ENCOUNTER
Care Due:                  Date            Visit Type   Department     Provider  --------------------------------------------------------------------------------                                EP -                              PRIMARY      [facility] INTERNAL  Last Visit: 10-      [facility]   [facility]       Taz Cartagena                              MYCHART                              ANNUAL                              CHECKUP/PHY  [facility] INTERNAL  Next Visit: 10-      [facility]       Taz Cartagena                                                            Last  Test          Frequency    Reason                     Performed    Due Date  --------------------------------------------------------------------------------    CMP.........  12 months..  rosuvastatin.............  Not Found    Overdue    Lipid Panel.  12 months..  rosuvastatin.............  06- 06-    [facility] Embedded Care Due Messages. Reference number: [id number].   9/10/2024 12:36:49 AM CDT

## 2024-09-12 RX ORDER — ROSUVASTATIN CALCIUM 10 MG/1
10 TABLET, COATED ORAL
Qty: 90 TABLET | Refills: 0 | Status: SHIPPED | OUTPATIENT
Start: 2024-09-12

## 2024-10-15 ENCOUNTER — LAB VISIT (OUTPATIENT)
Dept: LAB | Facility: HOSPITAL | Age: 59
End: 2024-10-15
Attending: FAMILY MEDICINE
Payer: COMMERCIAL

## 2024-10-15 ENCOUNTER — OFFICE VISIT (OUTPATIENT)
Dept: INTERNAL MEDICINE | Facility: CLINIC | Age: 59
End: 2024-10-15
Payer: COMMERCIAL

## 2024-10-15 VITALS
BODY MASS INDEX: 26.36 KG/M2 | HEART RATE: 78 BPM | WEIGHT: 173.94 LBS | SYSTOLIC BLOOD PRESSURE: 116 MMHG | RESPIRATION RATE: 18 BRPM | TEMPERATURE: 98 F | DIASTOLIC BLOOD PRESSURE: 86 MMHG | HEIGHT: 68 IN

## 2024-10-15 DIAGNOSIS — E03.9 HYPOTHYROIDISM, UNSPECIFIED TYPE: ICD-10-CM

## 2024-10-15 DIAGNOSIS — E78.5 HYPERLIPIDEMIA, UNSPECIFIED HYPERLIPIDEMIA TYPE: ICD-10-CM

## 2024-10-15 DIAGNOSIS — R73.03 PREDIABETES: ICD-10-CM

## 2024-10-15 DIAGNOSIS — K21.9 GASTROESOPHAGEAL REFLUX DISEASE WITHOUT ESOPHAGITIS: ICD-10-CM

## 2024-10-15 DIAGNOSIS — M85.80 OSTEOPENIA, UNSPECIFIED LOCATION: ICD-10-CM

## 2024-10-15 DIAGNOSIS — Z23 NEED FOR VACCINATION: ICD-10-CM

## 2024-10-15 DIAGNOSIS — Z00.00 ROUTINE HEALTH MAINTENANCE: Primary | ICD-10-CM

## 2024-10-15 DIAGNOSIS — I25.10 CORONARY ARTERY CALCIFICATION: ICD-10-CM

## 2024-10-15 PROCEDURE — 80053 COMPREHEN METABOLIC PANEL: CPT | Performed by: FAMILY MEDICINE

## 2024-10-15 PROCEDURE — 80061 LIPID PANEL: CPT | Performed by: FAMILY MEDICINE

## 2024-10-15 PROCEDURE — 90472 IMMUNIZATION ADMIN EACH ADD: CPT | Mod: S$GLB,,, | Performed by: FAMILY MEDICINE

## 2024-10-15 PROCEDURE — 90656 IIV3 VACC NO PRSV 0.5 ML IM: CPT | Mod: S$GLB,,, | Performed by: FAMILY MEDICINE

## 2024-10-15 PROCEDURE — 3079F DIAST BP 80-89 MM HG: CPT | Mod: CPTII,S$GLB,, | Performed by: FAMILY MEDICINE

## 2024-10-15 PROCEDURE — 3008F BODY MASS INDEX DOCD: CPT | Mod: CPTII,S$GLB,, | Performed by: FAMILY MEDICINE

## 2024-10-15 PROCEDURE — 36415 COLL VENOUS BLD VENIPUNCTURE: CPT | Performed by: FAMILY MEDICINE

## 2024-10-15 PROCEDURE — 3074F SYST BP LT 130 MM HG: CPT | Mod: CPTII,S$GLB,, | Performed by: FAMILY MEDICINE

## 2024-10-15 PROCEDURE — 83036 HEMOGLOBIN GLYCOSYLATED A1C: CPT | Performed by: FAMILY MEDICINE

## 2024-10-15 PROCEDURE — 1159F MED LIST DOCD IN RCRD: CPT | Mod: CPTII,S$GLB,, | Performed by: FAMILY MEDICINE

## 2024-10-15 PROCEDURE — 90677 PCV20 VACCINE IM: CPT | Mod: S$GLB,,, | Performed by: FAMILY MEDICINE

## 2024-10-15 PROCEDURE — 90471 IMMUNIZATION ADMIN: CPT | Mod: S$GLB,,, | Performed by: FAMILY MEDICINE

## 2024-10-15 PROCEDURE — 99396 PREV VISIT EST AGE 40-64: CPT | Mod: 25,S$GLB,, | Performed by: FAMILY MEDICINE

## 2024-10-15 PROCEDURE — 3044F HG A1C LEVEL LT 7.0%: CPT | Mod: CPTII,S$GLB,, | Performed by: FAMILY MEDICINE

## 2024-10-15 PROCEDURE — 99999 PR PBB SHADOW E&M-EST. PATIENT-LVL IV: CPT | Mod: PBBFAC,,, | Performed by: FAMILY MEDICINE

## 2024-10-15 PROCEDURE — 84443 ASSAY THYROID STIM HORMONE: CPT | Performed by: FAMILY MEDICINE

## 2024-10-16 LAB
ALBUMIN SERPL BCP-MCNC: 4.2 G/DL (ref 3.5–5.2)
ALP SERPL-CCNC: 61 U/L (ref 55–135)
ALT SERPL W/O P-5'-P-CCNC: 39 U/L (ref 10–44)
ANION GAP SERPL CALC-SCNC: 12 MMOL/L (ref 8–16)
AST SERPL-CCNC: 30 U/L (ref 10–40)
BILIRUB SERPL-MCNC: 0.6 MG/DL (ref 0.1–1)
BUN SERPL-MCNC: 7 MG/DL (ref 6–20)
CALCIUM SERPL-MCNC: 10.1 MG/DL (ref 8.7–10.5)
CHLORIDE SERPL-SCNC: 109 MMOL/L (ref 95–110)
CHOLEST SERPL-MCNC: 163 MG/DL (ref 120–199)
CHOLEST/HDLC SERPL: 3.1 {RATIO} (ref 2–5)
CO2 SERPL-SCNC: 23 MMOL/L (ref 23–29)
CREAT SERPL-MCNC: 0.8 MG/DL (ref 0.5–1.4)
EST. GFR  (NO RACE VARIABLE): >60 ML/MIN/1.73 M^2
ESTIMATED AVG GLUCOSE: 111 MG/DL (ref 68–131)
GLUCOSE SERPL-MCNC: 85 MG/DL (ref 70–110)
HBA1C MFR BLD: 5.5 % (ref 4–5.6)
HDLC SERPL-MCNC: 53 MG/DL (ref 40–75)
HDLC SERPL: 32.5 % (ref 20–50)
LDLC SERPL CALC-MCNC: 88.6 MG/DL (ref 63–159)
NONHDLC SERPL-MCNC: 110 MG/DL
POTASSIUM SERPL-SCNC: 5.1 MMOL/L (ref 3.5–5.1)
PROT SERPL-MCNC: 7.3 G/DL (ref 6–8.4)
SODIUM SERPL-SCNC: 144 MMOL/L (ref 136–145)
TRIGL SERPL-MCNC: 107 MG/DL (ref 30–150)
TSH SERPL DL<=0.005 MIU/L-ACNC: 0.8 UIU/ML (ref 0.4–4)

## 2024-12-04 RX ORDER — ROSUVASTATIN CALCIUM 10 MG/1
10 TABLET, COATED ORAL
Qty: 90 TABLET | Refills: 3 | Status: SHIPPED | OUTPATIENT
Start: 2024-12-04

## 2024-12-04 NOTE — TELEPHONE ENCOUNTER
No care due was identified.  [facility] Embedded Care Due Messages. Reference number: [id number].   12/04/2024 8:31:36 AM CST

## 2024-12-05 NOTE — TELEPHONE ENCOUNTER
Refill Decision Note   Vera Percy  is requesting a refill authorization.  Brief Assessment and Rationale for Refill:  Approve     Medication Therapy Plan:        Comments:     Note composed:6:13 PM 12/04/2024

## 2025-01-29 ENCOUNTER — OFFICE VISIT (OUTPATIENT)
Dept: INTERNAL MEDICINE | Facility: CLINIC | Age: 60
End: 2025-01-29
Payer: COMMERCIAL

## 2025-01-29 VITALS
WEIGHT: 179.69 LBS | DIASTOLIC BLOOD PRESSURE: 86 MMHG | BODY MASS INDEX: 27.23 KG/M2 | SYSTOLIC BLOOD PRESSURE: 112 MMHG | OXYGEN SATURATION: 96 % | HEART RATE: 83 BPM | HEIGHT: 68 IN | TEMPERATURE: 99 F

## 2025-01-29 DIAGNOSIS — J06.9 VIRAL URI WITH COUGH: Primary | ICD-10-CM

## 2025-01-29 DIAGNOSIS — J02.9 SORE THROAT: ICD-10-CM

## 2025-01-29 LAB
CTP QC/QA: YES
MOLECULAR STREP A: NEGATIVE
POC MOLECULAR INFLUENZA A AGN: NEGATIVE
POC MOLECULAR INFLUENZA B AGN: NEGATIVE
SARS-COV-2 RDRP RESP QL NAA+PROBE: NEGATIVE

## 2025-01-29 PROCEDURE — 87651 STREP A DNA AMP PROBE: CPT | Mod: QW,S$GLB,, | Performed by: NURSE PRACTITIONER

## 2025-01-29 PROCEDURE — 3008F BODY MASS INDEX DOCD: CPT | Mod: CPTII,S$GLB,, | Performed by: NURSE PRACTITIONER

## 2025-01-29 PROCEDURE — 1159F MED LIST DOCD IN RCRD: CPT | Mod: CPTII,S$GLB,, | Performed by: NURSE PRACTITIONER

## 2025-01-29 PROCEDURE — 87635 SARS-COV-2 COVID-19 AMP PRB: CPT | Mod: QW,S$GLB,, | Performed by: NURSE PRACTITIONER

## 2025-01-29 PROCEDURE — 1160F RVW MEDS BY RX/DR IN RCRD: CPT | Mod: CPTII,S$GLB,, | Performed by: NURSE PRACTITIONER

## 2025-01-29 PROCEDURE — 87502 INFLUENZA DNA AMP PROBE: CPT | Mod: QW,S$GLB,, | Performed by: NURSE PRACTITIONER

## 2025-01-29 PROCEDURE — 99213 OFFICE O/P EST LOW 20 MIN: CPT | Mod: S$GLB,,, | Performed by: NURSE PRACTITIONER

## 2025-01-29 PROCEDURE — 99999 PR PBB SHADOW E&M-EST. PATIENT-LVL III: CPT | Mod: PBBFAC,,, | Performed by: NURSE PRACTITIONER

## 2025-01-29 PROCEDURE — 3079F DIAST BP 80-89 MM HG: CPT | Mod: CPTII,S$GLB,, | Performed by: NURSE PRACTITIONER

## 2025-01-29 PROCEDURE — 3074F SYST BP LT 130 MM HG: CPT | Mod: CPTII,S$GLB,, | Performed by: NURSE PRACTITIONER

## 2025-01-29 RX ORDER — PROMETHAZINE HYDROCHLORIDE AND DEXTROMETHORPHAN HYDROBROMIDE 6.25; 15 MG/5ML; MG/5ML
5 SYRUP ORAL NIGHTLY PRN
Qty: 120 ML | Refills: 0 | Status: SHIPPED | OUTPATIENT
Start: 2025-01-29

## 2025-01-29 NOTE — PROGRESS NOTES
"Subjective:       Patient ID: Vera Greer is a 59 y.o. female.    Chief Complaint:   History of Present Illness    CHIEF COMPLAINT:  - Ms. Greer presents with symptoms of a viral illness, including sore throat, cough, and concern about being contagious.he     HPI:  Ms. Greer reports feeling unwell. Symptoms began with a sore throat, followed by cough onset the night before. Ms. Greer has mild ear discomfort, chills, and body aches today. 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 Flu vaccination was received in October. Ms. Greer reports a history of RSV over a year ago, noting that the current illness feels different from the RSV experience, which had caused chest pain. COVID-19 test was negative. 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    Ms. Greer denies nausea, vomiting, upset stomach, current COVID-19, or RSV.        /86 (Patient Position: Sitting)   Pulse 83   Temp 98.8 °F (37.1 °C) (Tympanic)   Ht 5' 8" (1.727 m)   Wt 81.5 kg (179 lb 10.8 oz)   SpO2 96%   BMI 27.32 kg/m²     Review of Systems   Constitutional:  Positive for chills and fatigue. Negative for activity change, appetite change, diaphoresis, fever and unexpected weight change.   HENT:  Positive for congestion, ear pain, postnasal drip, rhinorrhea and sore throat. Negative for dental problem, drooling, ear discharge, hearing loss, mouth sores, nosebleeds, sinus pressure, sneezing, tinnitus, trouble swallowing and voice change.    Eyes:  Negative for pain, discharge and redness.   Respiratory:  Positive for cough. Negative for choking, chest tightness, shortness of breath and wheezing.    Cardiovascular:  Negative for chest pain, palpitations and leg swelling.   Gastrointestinal:  Negative for abdominal pain, diarrhea, nausea and vomiting. "   Endocrine: Negative for cold intolerance and heat intolerance.   Genitourinary:  Negative for dysuria.   Musculoskeletal:  Positive for myalgias. Negative for arthralgias, joint swelling and neck pain.   Skin:  Negative for rash.   Allergic/Immunologic: Positive for environmental allergies. Negative for food allergies and immunocompromised state.   Neurological:  Negative for syncope, light-headedness and headaches.       Objective:      Physical Exam  Vitals and nursing note reviewed.   Constitutional:       Appearance: She is well-developed. She is not diaphoretic.   HENT:      Head: Normocephalic and atraumatic.      Right Ear: Tympanic membrane, ear canal and external ear normal.      Left Ear: Tympanic membrane, ear canal and external ear normal.      Nose: Mucosal edema and rhinorrhea present.      Right Sinus: No maxillary sinus tenderness or frontal sinus tenderness.      Left Sinus: No maxillary sinus tenderness or frontal sinus tenderness.      Mouth/Throat:      Pharynx: Uvula midline. No oropharyngeal exudate or posterior oropharyngeal erythema.   Eyes:      General:         Right eye: No discharge.         Left eye: No discharge.      Conjunctiva/sclera: Conjunctivae normal.   Cardiovascular:      Rate and Rhythm: Normal rate and regular rhythm.      Heart sounds: Normal heart sounds. No murmur heard.  Pulmonary:      Effort: Pulmonary effort is normal. No accessory muscle usage or respiratory distress.      Breath sounds: Normal breath sounds. No decreased breath sounds, wheezing, rhonchi or rales.   Chest:      Chest wall: No tenderness.   Abdominal:      General: There is no distension.      Palpations: Abdomen is soft.   Musculoskeletal:         General: Normal range of motion.      Cervical back: Normal range of motion.   Skin:     General: Skin is warm and dry.   Neurological:      Mental Status: She is alert and oriented to person, place, and time.   Psychiatric:         Behavior: Behavior  normal.         Assessment and Plan        Vera was seen today for cough, chills, sore throat and generalized body aches.    Diagnoses and all orders for this visit:    Viral URI with cough  -     POCT COVID-19 Rapid Screening  -     POCT Influenza A/B Molecular  -     promethazine-dextromethorphan (PROMETHAZINE-DM) 6.25-15 mg/5 mL Syrp; Take 5 mLs by mouth nightly as needed (Cough).    Sore throat  -     POCT Strep A, Molecular    BMI 27.0-27.9,adult      There are no Patient Instructions on file for this visit.      1. Viral URI with cough    2. Sore throat    3. BMI 27.0-27.9,adult        Assessment & Plan    UPPER RESPIRATORY INFECTION:  - COVID/FLU/STREP negative in office today  - encouraged supportive treatment for symptoms- tylenol/motrin for body aches, over the counter cold/flu medications  - if no improvement in symptoms, may want to consider retesting tomorrow for FLU/COVID  - avoid contact with elder parents due to illness  - Follow up for worsening or no improvement in symptoms and PRN.           This note was generated with the assistance of ambient listening technology. Verbal consent was obtained by the patient and accompanying visitor(s) for the recording of patient appointment to facilitate this note. I attest to having reviewed and edited the generated note for accuracy, though some syntax or spelling errors may persist. Please contact the author of this note for any clarification.

## 2025-06-17 ENCOUNTER — OFFICE VISIT (OUTPATIENT)
Dept: INTERNAL MEDICINE | Facility: CLINIC | Age: 60
End: 2025-06-17
Payer: COMMERCIAL

## 2025-06-17 ENCOUNTER — LAB VISIT (OUTPATIENT)
Dept: LAB | Facility: HOSPITAL | Age: 60
End: 2025-06-17
Attending: FAMILY MEDICINE
Payer: COMMERCIAL

## 2025-06-17 VITALS
BODY MASS INDEX: 26.88 KG/M2 | DIASTOLIC BLOOD PRESSURE: 74 MMHG | TEMPERATURE: 96 F | WEIGHT: 176.81 LBS | HEART RATE: 77 BPM | SYSTOLIC BLOOD PRESSURE: 110 MMHG | OXYGEN SATURATION: 98 %

## 2025-06-17 DIAGNOSIS — R73.03 PREDIABETES: ICD-10-CM

## 2025-06-17 DIAGNOSIS — Z00.00 ROUTINE HEALTH MAINTENANCE: Primary | ICD-10-CM

## 2025-06-17 DIAGNOSIS — I25.10 CORONARY ARTERY CALCIFICATION: ICD-10-CM

## 2025-06-17 DIAGNOSIS — K58.9 IRRITABLE BOWEL SYNDROME WITHOUT DIARRHEA: ICD-10-CM

## 2025-06-17 DIAGNOSIS — E78.5 HYPERLIPIDEMIA, UNSPECIFIED HYPERLIPIDEMIA TYPE: ICD-10-CM

## 2025-06-17 DIAGNOSIS — K21.9 GASTROESOPHAGEAL REFLUX DISEASE WITHOUT ESOPHAGITIS: ICD-10-CM

## 2025-06-17 DIAGNOSIS — E03.9 HYPOTHYROIDISM, UNSPECIFIED TYPE: ICD-10-CM

## 2025-06-17 DIAGNOSIS — F32.A DEPRESSION, UNSPECIFIED DEPRESSION TYPE: ICD-10-CM

## 2025-06-17 DIAGNOSIS — M85.80 OSTEOPENIA, UNSPECIFIED LOCATION: ICD-10-CM

## 2025-06-17 LAB
ALBUMIN SERPL BCP-MCNC: 4.5 G/DL (ref 3.5–5.2)
ALP SERPL-CCNC: 70 UNIT/L (ref 40–150)
ALT SERPL W/O P-5'-P-CCNC: 28 UNIT/L (ref 10–44)
ANION GAP (OHS): 9 MMOL/L (ref 8–16)
AST SERPL-CCNC: 26 UNIT/L (ref 11–45)
BILIRUB SERPL-MCNC: 0.4 MG/DL (ref 0.1–1)
BUN SERPL-MCNC: 12 MG/DL (ref 6–20)
CALCIUM SERPL-MCNC: 9.7 MG/DL (ref 8.7–10.5)
CHLORIDE SERPL-SCNC: 108 MMOL/L (ref 95–110)
CHOLEST SERPL-MCNC: 169 MG/DL (ref 120–199)
CHOLEST/HDLC SERPL: 3.3 {RATIO} (ref 2–5)
CO2 SERPL-SCNC: 26 MMOL/L (ref 23–29)
CREAT SERPL-MCNC: 0.9 MG/DL (ref 0.5–1.4)
CRP SERPL-MCNC: 1.03 MG/L
EAG (OHS): 120 MG/DL (ref 68–131)
GFR SERPLBLD CREATININE-BSD FMLA CKD-EPI: >60 ML/MIN/1.73/M2
GLUCOSE SERPL-MCNC: 100 MG/DL (ref 70–110)
HBA1C MFR BLD: 5.8 % (ref 4–5.6)
HDLC SERPL-MCNC: 51 MG/DL (ref 40–75)
HDLC SERPL: 30.2 % (ref 20–50)
LDLC SERPL CALC-MCNC: 101.2 MG/DL (ref 63–159)
NONHDLC SERPL-MCNC: 118 MG/DL
POTASSIUM SERPL-SCNC: 4.4 MMOL/L (ref 3.5–5.1)
PROT SERPL-MCNC: 7.5 GM/DL (ref 6–8.4)
SODIUM SERPL-SCNC: 143 MMOL/L (ref 136–145)
TRIGL SERPL-MCNC: 84 MG/DL (ref 30–150)
TSH SERPL-ACNC: 0.8 UIU/ML (ref 0.4–4)

## 2025-06-17 PROCEDURE — 80053 COMPREHEN METABOLIC PANEL: CPT

## 2025-06-17 PROCEDURE — 3078F DIAST BP <80 MM HG: CPT | Mod: CPTII,S$GLB,, | Performed by: FAMILY MEDICINE

## 2025-06-17 PROCEDURE — 1159F MED LIST DOCD IN RCRD: CPT | Mod: CPTII,S$GLB,, | Performed by: FAMILY MEDICINE

## 2025-06-17 PROCEDURE — 3074F SYST BP LT 130 MM HG: CPT | Mod: CPTII,S$GLB,, | Performed by: FAMILY MEDICINE

## 2025-06-17 PROCEDURE — 99396 PREV VISIT EST AGE 40-64: CPT | Mod: S$GLB,,, | Performed by: FAMILY MEDICINE

## 2025-06-17 PROCEDURE — 3008F BODY MASS INDEX DOCD: CPT | Mod: CPTII,S$GLB,, | Performed by: FAMILY MEDICINE

## 2025-06-17 PROCEDURE — 80061 LIPID PANEL: CPT

## 2025-06-17 PROCEDURE — 36415 COLL VENOUS BLD VENIPUNCTURE: CPT

## 2025-06-17 PROCEDURE — 83036 HEMOGLOBIN GLYCOSYLATED A1C: CPT

## 2025-06-17 PROCEDURE — 84443 ASSAY THYROID STIM HORMONE: CPT

## 2025-06-17 PROCEDURE — 86141 C-REACTIVE PROTEIN HS: CPT

## 2025-06-17 PROCEDURE — 83695 ASSAY OF LIPOPROTEIN(A): CPT

## 2025-06-17 PROCEDURE — 99999 PR PBB SHADOW E&M-EST. PATIENT-LVL III: CPT | Mod: PBBFAC,,, | Performed by: FAMILY MEDICINE

## 2025-06-17 RX ORDER — BUPROPION HYDROCHLORIDE 150 MG/1
150 TABLET ORAL DAILY
Qty: 90 TABLET | Refills: 4 | Status: SHIPPED | OUTPATIENT
Start: 2025-06-17

## 2025-06-17 RX ORDER — HYOSCYAMINE SULFATE 0.38 MG/1
0.38 TABLET, EXTENDED RELEASE ORAL EVERY 12 HOURS PRN
Qty: 180 TABLET | Refills: 3 | Status: SHIPPED | OUTPATIENT
Start: 2025-06-17

## 2025-06-17 NOTE — PROGRESS NOTES
Chief Complaint: Annual Exam    History of Present Illness    CHIEF COMPLAINT:  Ms. Greer presents today for medication refills and annual follow-up    CURRENT MEDICATIONS:  She continues Hyoscyamine 0.375 mg twice daily, Bupropion, and Rosuvastatin.    DERMATOLOGIC HISTORY:  She has a history of squamous cell carcinoma and basal cell carcinoma. She reports a recurrent circular lip lesion previously treated with laser therapy.    PREVENTIVE CARE:  Her last OBGYN visit was in March. Bone density scan was completed in . Last colonoscopy was at age 51, and she will be 61 next year.    FAMILY HISTORY:  Uncle  of massive heart attack. Father-in-law  in October from dementia with aortic aneurysm.     History of hypothyroidism off thyroid medication normal TSH    She wants to move annual visits to  has a was few years ago    Sees cardiologist Dr. Villegas and he requests that we add lipoprotein a and high sensitivity CRP to her lab and he has access to this she is on a statin    Prediabetes controlled         Objective:   Physical Exam    Vitals: Reviewed. Nursing note reviewed.  Constitutional: Alert.  HENT: Normocephalic. Atraumatic. External ears normal. Nose normal. PERRL. Conjunctivae normal.  Neck: ROM normal. Supple.  Cardiovascular: Normal rate and regular rhythm. Normal heart sounds.  Pulmonary: Normal breath sounds. Effort normal.  Abdominal: Bowel sounds are normal. Soft.  Musculoskeletal: ROM normal.  Skin: Warm. Dry. SKIN LESION/ABRASION NOTED.  Neurological: Oriented x3.  Psychiatric: Behavior normal. Thought content normal. Judgment normal.       Assessment:       1. Routine health maintenance    2. Irritable bowel syndrome without diarrhea    3. Depression, unspecified depression type    4. Coronary artery calcification    5. Prediabetes    6. Osteopenia, unspecified location    7. Hyperlipidemia, unspecified hyperlipidemia type    8. Gastroesophageal reflux disease without esophagitis    9.  Hypothyroidism, unspecified type        Plan:   Assessment & Plan    Reviewed medication regimen.  Considered addition of aspirin therapy due to coronary artery calcification, but deferred decision to cardiologist Dr. Villegas.  Evaluated skin for potential lesions, noting history of skin cancers.  Assessed carotid arteries and abdominal organs.    PLAN SUMMARY:  Ordered annual lab work including new cardiac tests (Lp(a) and apoB)  Continued rosuvastatin (Crestor)  Continued hyoscyamine 0.375 mg twice daily and bupropion (Wellbutrin)  Canceled previously scheduled October lab work  Referred patient to dermatologist for skin and lip lesion monitoring  Will message cardiologist Dr. Villegas after lab results are available  Follow up in 1 year for annual visit with lab work scheduled    SKIN DISORDER AND LESIONS:  Monitoring small abrasion noticed during exam for any changes.  Ms. Greer has multiple skin lesions including a recurring lip lesion that shows texture change.  The lip lesion was previously treated with laser therapy but continues to recur.  Referred the patient to a dermatologist for ongoing monitoring of all skin and lip lesions.    HISTORY OF SKIN CANCER:  Ms. Greer has a history of 2 skin cancers: 1 squamous cell carcinoma and 1 basal cell carcinoma.    CARDIOVASCULAR HEALTH AND FAMILY HISTORY:  Discussed evolving recommendations regarding aspirin therapy for cardiovascular risk reduction, explaining the balance of benefits and risks, particularly in the context of coronary artery calcification and statin therapy.  Ordered annual lab work including newly recommended cardiac tests (Lp(a) and apoB) as requested by cardiologist.  Documented significant family history: patient's uncle  of a massive heart attack and patient's 's father  of an aortic aneurysm.  Will message cardiologist Dr. Villegas after lab results are available for interpretation and further recommendations.  Continued rosuvastatin  (Crestor).    MEDICATIONS AND FOLLOW-UP:  Continued hyoscyamine 0.375 mg twice daily and bupropion (Wellbutrin).  Canceled previously scheduled October lab work.  Follow up in 1 year for annual visit with lab work scheduled, excluding the 2 new cardiac tests unless specifically requested again.          Cancel October lab  Lab today  Also Lab 12 months and follow up after

## 2025-06-20 LAB — W LP(A): 177 NMOL/L

## 2025-06-29 ENCOUNTER — RESULTS FOLLOW-UP (OUTPATIENT)
Dept: INTERNAL MEDICINE | Facility: CLINIC | Age: 60
End: 2025-06-29